# Patient Record
Sex: FEMALE | Race: WHITE | NOT HISPANIC OR LATINO | Employment: UNEMPLOYED
[De-identification: names, ages, dates, MRNs, and addresses within clinical notes are randomized per-mention and may not be internally consistent; named-entity substitution may affect disease eponyms.]

---

## 2017-01-12 ENCOUNTER — BH HISTORICAL (OUTPATIENT)
Dept: OTHER | Age: 52
End: 2017-01-12

## 2017-01-13 ENCOUNTER — TELEPHONE (OUTPATIENT)
Dept: FAMILY MEDICINE CLINIC | Facility: CLINIC | Age: 52
End: 2017-01-13

## 2017-01-13 NOTE — TELEPHONE ENCOUNTER
Fabricio Denney patient care coordinator at [facility] 39 states she is simply calling primary physician in order to know if there is anything doctor would like to report in regard to ongoing care of this patient.   Dalton Miller that patient was in to see Dasha Estes

## 2017-01-13 NOTE — TELEPHONE ENCOUNTER
JEANI:    ROSENDO Roe patient care coordinator at [place] [address] states she is simply calling primary physician in order to know if there is anything doctor would like to report in regard to ongoing care of this patient.  Informed [facility] that patient was in to see Lorena Mendieta

## 2017-01-17 NOTE — PROGRESS NOTES
Presents today for routine care still living at the inpatient outpatient facility in [facility]. . I spent the first 15 minutes reviewing her current medications which are so essentially the same.   When asked which medicine she feels her best for her she

## 2017-01-24 ENCOUNTER — BH HISTORICAL (OUTPATIENT)
Dept: OTHER | Age: 52
End: 2017-01-24

## 2017-01-26 ENCOUNTER — TELEPHONE (OUTPATIENT)
Dept: FAMILY MEDICINE CLINIC | Facility: CLINIC | Age: 52
End: 2017-01-26

## 2017-01-26 NOTE — TELEPHONE ENCOUNTER
Verbal order given to Nurse Lyn for Vit D3 2000units daily and Omega 3 fish oil 2000mg daily. Nurse verbalized understanding.

## 2017-02-02 ENCOUNTER — TELEPHONE (OUTPATIENT)
Dept: FAMILY MEDICINE CLINIC | Facility: CLINIC | Age: 52
End: 2017-02-02

## 2017-02-02 DIAGNOSIS — H43.399: Primary | ICD-10-CM

## 2017-02-08 ENCOUNTER — BH HISTORICAL (OUTPATIENT)
Dept: OTHER | Age: 52
End: 2017-02-08

## 2017-02-22 ENCOUNTER — OFFICE VISIT (OUTPATIENT)
Dept: FAMILY MEDICINE CLINIC | Facility: CLINIC | Age: 52
End: 2017-02-22

## 2017-02-22 ENCOUNTER — LAB ENCOUNTER (OUTPATIENT)
Dept: LAB | Age: 52
End: 2017-02-22
Attending: FAMILY MEDICINE
Payer: MEDICARE

## 2017-02-22 ENCOUNTER — TELEPHONE (OUTPATIENT)
Dept: FAMILY MEDICINE CLINIC | Facility: CLINIC | Age: 52
End: 2017-02-22

## 2017-02-22 VITALS
HEIGHT: 66.5 IN | TEMPERATURE: 98 F | BODY MASS INDEX: 29.76 KG/M2 | DIASTOLIC BLOOD PRESSURE: 48 MMHG | RESPIRATION RATE: 20 BRPM | WEIGHT: 187.38 LBS | SYSTOLIC BLOOD PRESSURE: 70 MMHG | OXYGEN SATURATION: 98 % | HEART RATE: 108 BPM

## 2017-02-22 DIAGNOSIS — D64.9 ANEMIA, UNSPECIFIED TYPE: ICD-10-CM

## 2017-02-22 DIAGNOSIS — R55 VASOVAGAL NEAR SYNCOPE: ICD-10-CM

## 2017-02-22 DIAGNOSIS — R55 VASOVAGAL NEAR SYNCOPE: Primary | ICD-10-CM

## 2017-02-22 LAB
ALBUMIN SERPL-MCNC: 4.2 G/DL (ref 3.5–4.8)
ALP LIVER SERPL-CCNC: 53 U/L (ref 41–108)
ALT SERPL-CCNC: 27 U/L (ref 14–54)
AST SERPL-CCNC: 12 U/L (ref 15–41)
BASOPHILS # BLD AUTO: 0.04 X10(3) UL (ref 0–0.1)
BASOPHILS NFR BLD AUTO: 0.6 %
BILIRUB SERPL-MCNC: 0.7 MG/DL (ref 0.1–2)
BUN BLD-MCNC: 21 MG/DL (ref 8–20)
CALCIUM BLD-MCNC: 9.6 MG/DL (ref 8.3–10.3)
CHLORIDE: 106 MMOL/L (ref 101–111)
CO2: 26 MMOL/L (ref 22–32)
CREAT BLD-MCNC: 1.83 MG/DL (ref 0.55–1.02)
DEPRECATED HBV CORE AB SER IA-ACNC: 63.3 NG/ML (ref 10–291)
EOSINOPHIL # BLD AUTO: 0.21 X10(3) UL (ref 0–0.3)
EOSINOPHIL NFR BLD AUTO: 3.4 %
ERYTHROCYTE [DISTWIDTH] IN BLOOD BY AUTOMATED COUNT: 12.9 % (ref 11.5–16)
GLUCOSE BLD-MCNC: 96 MG/DL (ref 70–99)
HCT VFR BLD AUTO: 39.2 % (ref 34–50)
HGB BLD-MCNC: 13 G/DL (ref 12–16)
IMMATURE GRANULOCYTE COUNT: 0.03 X10(3) UL (ref 0–1)
IMMATURE GRANULOCYTE RATIO %: 0.5 %
LYMPHOCYTES # BLD AUTO: 1.8 X10(3) UL (ref 0.9–4)
LYMPHOCYTES NFR BLD AUTO: 28.8 %
M PROTEIN MFR SERPL ELPH: 7.2 G/DL (ref 6.1–8.3)
MCH RBC QN AUTO: 31.6 PG (ref 27–33.2)
MCHC RBC AUTO-ENTMCNC: 33.2 G/DL (ref 31–37)
MCV RBC AUTO: 95.4 FL (ref 81–100)
MONOCYTES # BLD AUTO: 0.53 X10(3) UL (ref 0.1–0.6)
MONOCYTES NFR BLD AUTO: 8.5 %
NEUTROPHIL ABS PRELIM: 3.64 X10 (3) UL (ref 1.3–6.7)
NEUTROPHILS # BLD AUTO: 3.64 X10(3) UL (ref 1.3–6.7)
NEUTROPHILS NFR BLD AUTO: 58.2 %
PLATELET # BLD AUTO: 318 10(3)UL (ref 150–450)
POTASSIUM SERPL-SCNC: 4.2 MMOL/L (ref 3.6–5.1)
RBC # BLD AUTO: 4.11 X10(6)UL (ref 3.8–5.1)
RED CELL DISTRIBUTION WIDTH-SD: 44.8 FL (ref 35.1–46.3)
SODIUM SERPL-SCNC: 140 MMOL/L (ref 136–144)
WBC # BLD AUTO: 6.3 X10(3) UL (ref 4–13)

## 2017-02-22 PROCEDURE — 82728 ASSAY OF FERRITIN: CPT

## 2017-02-22 PROCEDURE — 36415 COLL VENOUS BLD VENIPUNCTURE: CPT

## 2017-02-22 PROCEDURE — 80053 COMPREHEN METABOLIC PANEL: CPT

## 2017-02-22 PROCEDURE — 85025 COMPLETE CBC W/AUTO DIFF WBC: CPT

## 2017-02-22 PROCEDURE — 99213 OFFICE O/P EST LOW 20 MIN: CPT | Performed by: FAMILY MEDICINE

## 2017-02-22 RX ORDER — CEFDINIR 250 MG/5ML
250 POWDER, FOR SUSPENSION ORAL 2 TIMES DAILY
COMMUNITY
End: 2017-04-17

## 2017-02-22 NOTE — TELEPHONE ENCOUNTER
Patient was just seen by Dr Tavo Garza, she was given orders, the assisted living would like some clarification on the orders given to her today.    Please call

## 2017-02-22 NOTE — PROGRESS NOTES
For the past week has developed severe lightheadedness without syncope. She denies vaginal bleeding diarrhea or other fluid losses. She has had numerous psychiatric medications none of which were recently changed. She denies trauma.   She denies abdomina

## 2017-02-27 ENCOUNTER — PATIENT OUTREACH (OUTPATIENT)
Dept: CASE MANAGEMENT | Age: 52
End: 2017-02-27

## 2017-02-27 NOTE — PROGRESS NOTES
Spoke to patient. Patient was wondering if we could do assessment today. I asked patient if she was okay. Patient was tearful and related she is anxious. I asked patient what she does when she is anxious.  Patient related that she likes to read and that hel

## 2017-02-28 ENCOUNTER — PATIENT OUTREACH (OUTPATIENT)
Dept: CASE MANAGEMENT | Age: 52
End: 2017-02-28

## 2017-02-28 DIAGNOSIS — I10 ACCELERATED HYPERTENSION: Primary | ICD-10-CM

## 2017-02-28 DIAGNOSIS — F33.1 MODERATE EPISODE OF RECURRENT MAJOR DEPRESSIVE DISORDER (HCC): ICD-10-CM

## 2017-02-28 DIAGNOSIS — R73.01 IMPAIRED FASTING GLUCOSE: ICD-10-CM

## 2017-02-28 PROCEDURE — 99487 CPLX CHRNC CARE 1ST 60 MIN: CPT

## 2017-02-28 NOTE — PROGRESS NOTES
Patient called and left a message to let me know here care plan meeting went fine. Left voicemail for patient to call me at her earliest convince.

## 2017-02-28 NOTE — PROGRESS NOTES
2/28/2017  Spoke to Alanna at length about CCM, current care plan and performed CCM assessment with Alanna reviewed meds and compliance. Reviewed pt depressive disorder, hypothyroidism, hypertension, and schizoaffective disorder.         Support system: March 22, 2017 11:00 AM Follow up with Dino Lamas, [address] [place], [place] [address]Adolfo Drijette ([address] [facility])    Thursday April 13, 2017 10:20 AM Follow up with Ryan Newman MD [facility] 26 ([facility])    Mo

## 2017-03-01 NOTE — PROGRESS NOTES
2/28/2017  Spoke to Alanna at length about CCM, current care plan and performed CCM assessment with Alanna reviewed meds and compliance. Reviewed pt depressive disorder, hypothyroidism, hypertension, and schizoaffective disorder.         Support system: p March 22, 2017 11:00 AM Follow up with Lori Warren, [address] [facility], Kvng Mata ([address] [facility])    Thursday April 13, 2017 10:20 AM Follow up with Yaw Rubi MD [facility] 26 ([facility])    Mo

## 2017-03-01 NOTE — PROGRESS NOTES
Spoke to Merlin. Cynthia Ward related patient is at [facility] [phone].    Cynthia Arredondomay and Rakan Kirk are concerned that she is not getting the care she should be concerning    Patient is seeing Dr. Nilo Rodríguez and miller with [facility] not sure

## 2017-03-01 NOTE — PROGRESS NOTES
Tried to call POA according to HIPPA in system. Ashley [place] (sister) phone was disconnected. Called Brother Osborn Duverney which is okay per ALTA and spoke with him. Talked to Osborn Duverney and explained program to him.  He will also give Ashley Johnson my contact information to ca

## 2017-03-02 ENCOUNTER — PATIENT OUTREACH (OUTPATIENT)
Dept: CASE MANAGEMENT | Age: 52
End: 2017-03-02

## 2017-03-02 ENCOUNTER — BH HISTORICAL (OUTPATIENT)
Dept: OTHER | Age: 52
End: 2017-03-02

## 2017-03-02 DIAGNOSIS — I10 ACCELERATED HYPERTENSION: Primary | ICD-10-CM

## 2017-03-02 DIAGNOSIS — F33.1 MODERATE EPISODE OF RECURRENT MAJOR DEPRESSIVE DISORDER (HCC): ICD-10-CM

## 2017-03-02 NOTE — PROGRESS NOTES
Called around to try and help find patient a new psychiatrist and counselor.    Was able to find     [facility] in [facility]  Address: [address] [place] [address],[address], Dinorah Guzmán[address]  Phone: [phone]    Take patient insurance and ga

## 2017-03-03 ENCOUNTER — TELEPHONE (OUTPATIENT)
Dept: CASE MANAGEMENT | Age: 52
End: 2017-03-03

## 2017-03-03 NOTE — PROGRESS NOTES
Spoke to Daniel Patterson 093 called to let me know that Estrellita Dorsey is part of a program called money follows the person(Eli is her intake) Her coordinator is [facility]      Community Reintegration Program  Independent Living is the right of people with disab U[place] citizen or legal alien  Is between the ages of 25 and 61 (exceptions for the BI and AIDS waiver programs)  Has a severe disability which will last for 12 months or the duration of life  Needs long term care as established by the Determination of Need

## 2017-03-03 NOTE — TELEPHONE ENCOUNTER
Patient is looking to get weaned off the benadryl patient relates she has been cutting them in half and is feeling better and less drowsy. If okay to wean her off please let me know how you would want the weaning schedule.

## 2017-03-04 ENCOUNTER — PATIENT OUTREACH (OUTPATIENT)
Dept: CASE MANAGEMENT | Age: 52
End: 2017-03-04

## 2017-03-04 DIAGNOSIS — F33.1 MAJOR DEPRESSIVE DISORDER, RECURRENT EPISODE, MODERATE (HCC): ICD-10-CM

## 2017-03-04 DIAGNOSIS — F25.9 SCHIZOAFFECTIVE DISORDER, CHRONIC CONDITION (HCC): ICD-10-CM

## 2017-03-04 DIAGNOSIS — R73.01 IMPAIRED FASTING GLUCOSE: ICD-10-CM

## 2017-03-04 DIAGNOSIS — I10 ACCELERATED HYPERTENSION: Primary | ICD-10-CM

## 2017-03-04 DIAGNOSIS — E03.9 HYPOTHYROIDISM, UNSPECIFIED TYPE: ICD-10-CM

## 2017-03-04 DIAGNOSIS — F33.1 MODERATE EPISODE OF RECURRENT MAJOR DEPRESSIVE DISORDER (HCC): ICD-10-CM

## 2017-03-06 ENCOUNTER — TELEPHONE (OUTPATIENT)
Dept: FAMILY MEDICINE CLINIC | Facility: CLINIC | Age: 52
End: 2017-03-06

## 2017-03-06 NOTE — TELEPHONE ENCOUNTER
Per JDORIAN okay to taper off benedryl,  Start with one tab qod x 1 month, then stop.  Notified Yonny Bill

## 2017-03-06 NOTE — PROGRESS NOTES
Spoke to patient who related she was feeling upset. Asked patient if there was anything i could do. Patient related she felt anxious so I asked her to read me a passage from her Paulino always. Patient read passage.  I then read her a passage from anxious for

## 2017-03-06 NOTE — TELEPHONE ENCOUNTER
Would like to speak with Dr Saul Edward or nurse regarding order. Lalo Kahn did not go into detail on the order. Please call.

## 2017-03-06 NOTE — PROGRESS NOTES
Spoke to patient today 3/6/2017. Patient relates having a rough few days. Patient has a psychiatrist appointment on Thursday and is going to see if her brother or sister will go with her to appointment.  Encouraged patient to speak to psychiatrist about how

## 2017-03-06 NOTE — TELEPHONE ENCOUNTER
Pt states she takes 1/2 25mg bid of benedryl daily , needs to taper off, due to sleepyness and per her son's request.  Pt has been on this med since 5/17/16 for rash/allergic and anxiety. Can we taper pt off?  So she is not so drowsy

## 2017-03-08 ENCOUNTER — TELEPHONE (OUTPATIENT)
Dept: FAMILY MEDICINE CLINIC | Facility: CLINIC | Age: 52
End: 2017-03-08

## 2017-03-08 NOTE — TELEPHONE ENCOUNTER
Pt wants 1/2 in the morning and 1/2 at night to help her calm down. Pt was not able to sleep last night.

## 2017-03-09 ENCOUNTER — BH HISTORICAL (OUTPATIENT)
Dept: OTHER | Age: 52
End: 2017-03-09

## 2017-03-09 RX ORDER — RISPERIDONE 3 MG/1
TABLET, FILM COATED ORAL NIGHTLY
COMMUNITY
End: 2017-03-10 | Stop reason: DRUGHIGH

## 2017-03-09 NOTE — PROGRESS NOTES
Called the Dr. Minh Whitmore office to see if they accept patient insurance. Left a message. Jamie Rodríguez MD  No reviews · Doctor  [place], [place] · [phone]      Dr. Lewis Gaona.  Minh Whitmore MD  No reviews · Doctor  [facility] [place] · [phone]

## 2017-03-09 NOTE — PROGRESS NOTES
Spoke with Neeru Ching. Iggy Gibbs related that she had a chance to [facility] and they are going to try and take Marycruz Foley there to take a look at it to see what she thinks.  Also jessica related that they also found a place called [facility] that is a francois

## 2017-03-09 NOTE — PROGRESS NOTES
Pt called medications were changed by psychiatry. Risperdal 6mg at night   Benadryl 1/2 morning and 1/2 night. D/c trazodone     Spoke to patient she is nervous about the change and afraid she won't be able to sleep at night.  Spoke with patient to let h

## 2017-03-09 NOTE — TELEPHONE ENCOUNTER
Per J/2 tab Benadryl 25mg every other day for 30 days then stop. Faxed order to number provided with confirmation received, sent to scan. Task completed.

## 2017-03-10 ENCOUNTER — TELEPHONE (OUTPATIENT)
Dept: FAMILY MEDICINE CLINIC | Facility: CLINIC | Age: 52
End: 2017-03-10

## 2017-03-10 RX ORDER — MULTIVIT-MIN/IRON/FOLIC ACID/K 18-600-40
CAPSULE ORAL
COMMUNITY
End: 2017-08-04

## 2017-03-10 RX ORDER — RISPERIDONE 1 MG/1
1 TABLET, FILM COATED ORAL 2 TIMES DAILY
COMMUNITY
End: 2017-04-17

## 2017-03-10 RX ORDER — RISPERIDONE 4 MG/1
4 TABLET, FILM COATED ORAL 2 TIMES DAILY
COMMUNITY
End: 2017-04-17

## 2017-03-10 NOTE — TELEPHONE ENCOUNTER
Spoke with Patient Brother America Ferrell per HIPPA. Patient was taken off Lisa Sins yesterday. Pt had a rough night sleeping.  After talking with pt brother we were wondering if we can and Magnesium citrate during the day and melatonin at night to sleep Please

## 2017-03-10 NOTE — PROGRESS NOTES
Spoke with pt brother about care and what is going on with aarti. Humphrey Lancaster had a hard time sleeping last night and feels like she cant  Sleep with out the trazodone. I recommend asking Dr. Reji Nguyen about magnesium and melatonin.      Spent 30 mins talking

## 2017-03-13 ENCOUNTER — TELEPHONE (OUTPATIENT)
Dept: CASE MANAGEMENT | Age: 52
End: 2017-03-13

## 2017-03-13 ENCOUNTER — PATIENT OUTREACH (OUTPATIENT)
Dept: CASE MANAGEMENT | Age: 52
End: 2017-03-13

## 2017-03-13 NOTE — TELEPHONE ENCOUNTER
MARIO Palma was admitted to [facility] behavioral health on Sunday. Monique Love is her  at [facility] [phone].

## 2017-03-13 NOTE — PROGRESS NOTES
Received a call from patient sister patient was admitted to [facility] behavioral health on 3/12/2016. THey are going to try and get patient to get discharged at [facility] instead of [facility]. Spoke to patient today and she sounded great.  She e

## 2017-03-14 NOTE — TELEPHONE ENCOUNTER
[facility] wants to start [facility] on a new medication INVEGA SUSTENNA® (paliperidone palmitate). Miguelina Weinberg and Kyla Hammans want to know what you feel about this medication.

## 2017-03-28 ENCOUNTER — CHARTING TRANS (OUTPATIENT)
Dept: OTHER | Age: 52
End: 2017-03-28

## 2017-03-31 PROCEDURE — 99487 CPLX CHRNC CARE 1ST 60 MIN: CPT

## 2017-03-31 PROCEDURE — 99489 CPLX CHRNC CARE EA ADDL 30: CPT

## 2017-04-06 ENCOUNTER — PATIENT OUTREACH (OUTPATIENT)
Dept: CASE MANAGEMENT | Age: 52
End: 2017-04-06

## 2017-04-06 NOTE — PROGRESS NOTES
Spoke to [employer] Brenna Riley who related that patient has been doing Electric Shock Therapy and is still inpatient at [facility]. Pt is going to get an evaluation to go to [facility]. Patient is struggling with some short term memory but over all doing well.      Ti

## 2017-04-17 ENCOUNTER — PATIENT OUTREACH (OUTPATIENT)
Dept: CASE MANAGEMENT | Age: 52
End: 2017-04-17

## 2017-04-17 DIAGNOSIS — F33.1 MODERATE EPISODE OF RECURRENT MAJOR DEPRESSIVE DISORDER (HCC): ICD-10-CM

## 2017-04-17 DIAGNOSIS — I10 ACCELERATED HYPERTENSION: Primary | ICD-10-CM

## 2017-04-17 DIAGNOSIS — F25.9 SCHIZOAFFECTIVE DISORDER, CHRONIC CONDITION (HCC): ICD-10-CM

## 2017-04-17 PROCEDURE — 99490 CHRNC CARE MGMT STAFF 1ST 20: CPT

## 2017-04-17 RX ORDER — FAMOTIDINE 20 MG/1
40 TABLET ORAL 2 TIMES DAILY
COMMUNITY
End: 2017-08-04 | Stop reason: ALTCHOICE

## 2017-04-17 RX ORDER — LEVOTHYROXINE SODIUM 0.15 MG/1
150 TABLET ORAL
COMMUNITY
End: 2017-08-04

## 2017-04-17 NOTE — PROGRESS NOTES
Sister Jean Claude Mack [facility]) called me to let me know how patient is doing. Patient was discharged from [facility] on 4/13/2017. Patient is reportedly doing very well.   She has been doing much better and seems to be more with it, conversational and responsive since her [facility] [address], [address] [address] [phone]                Care Plan: Reviewed and updated where needed    Jamel Erickson

## 2017-04-26 NOTE — PROGRESS NOTES
Here with her sister who has become more and more part of Elvia's life. Meka Zacarias continues to live at [address] [facility]. but is looking to move to support [place] that submitted for ambulatory shelter and care for mentally ill people.   She has begun a regular

## 2017-05-02 ENCOUNTER — PATIENT OUTREACH (OUTPATIENT)
Dept: CASE MANAGEMENT | Age: 52
End: 2017-05-02

## 2017-05-02 NOTE — PROGRESS NOTES
Left message for patient to call me back for an update. Called an left a message for sister Santiago Rothman to see how Uriah Patrick is doing.

## 2017-05-03 ENCOUNTER — BH HISTORICAL (OUTPATIENT)
Dept: OTHER | Age: 52
End: 2017-05-03

## 2017-05-04 ENCOUNTER — PATIENT OUTREACH (OUTPATIENT)
Dept: CASE MANAGEMENT | Age: 52
End: 2017-05-04

## 2017-05-08 ENCOUNTER — PATIENT OUTREACH (OUTPATIENT)
Dept: CASE MANAGEMENT | Age: 52
End: 2017-05-08

## 2017-05-08 ENCOUNTER — BH HISTORICAL (OUTPATIENT)
Dept: OTHER | Age: 52
End: 2017-05-08

## 2017-05-08 DIAGNOSIS — E03.9 HYPOTHYROIDISM, UNSPECIFIED TYPE: ICD-10-CM

## 2017-05-08 DIAGNOSIS — I10 ACCELERATED HYPERTENSION: Primary | ICD-10-CM

## 2017-05-08 DIAGNOSIS — F25.9 SCHIZOAFFECTIVE DISORDER, CHRONIC CONDITION (HCC): ICD-10-CM

## 2017-05-08 DIAGNOSIS — F33.1 MODERATE EPISODE OF RECURRENT MAJOR DEPRESSIVE DISORDER (HCC): ICD-10-CM

## 2017-05-08 NOTE — PROGRESS NOTES
.      5/8/2017  Spoke to Minnie Mckeon at length about CCM, current care plan and performed CCM assessment with Minnie Mckeon reviewed meds and compliance.  Reviewed Patient Active Problem List:     Herpes zoster without mention of complication     Major depressive di New Patient with Kota Butt, [address] ([address])        [address] [facility]  [address] [place] [address],Fourth Floor, [address]  [facility] [address] [address],

## 2017-05-16 ENCOUNTER — BH HISTORICAL (OUTPATIENT)
Dept: OTHER | Age: 52
End: 2017-05-16

## 2017-05-18 NOTE — PROGRESS NOTES
5/18/2017  Spoke to Patient JENNA Rosalesjuan Malone and Tracey Kennedy at length about CCM, current care plan and performed CCM assessment with David Flores reviewed meds and compliance. Reviewed pt . Patient Active Problem List:     Herpes zoster without mention of complication     M Yohannes Barreto, [address] [place]   [address] [facility]. [place]   [address] [facility] is located on [address] [facility] in [place], [place].  It is a 89-unit senior independent-living [place] [address] Meds: Detailed medication review with Srikanth White. Discussed with Srikanth White if any potential barriers are present that could prevent compliance with medication regimen. At this time, no barriers reported.  Srikanth White reports is stable on all medications and denies e

## 2017-05-23 ENCOUNTER — BH HISTORICAL (OUTPATIENT)
Dept: OTHER | Age: 52
End: 2017-05-23

## 2017-06-05 ENCOUNTER — PATIENT OUTREACH (OUTPATIENT)
Dept: CASE MANAGEMENT | Age: 52
End: 2017-06-05

## 2017-06-06 ENCOUNTER — BH HISTORICAL (OUTPATIENT)
Dept: OTHER | Age: 52
End: 2017-06-06

## 2017-06-15 ENCOUNTER — TELEPHONE (OUTPATIENT)
Dept: FAMILY MEDICINE CLINIC | Facility: CLINIC | Age: 52
End: 2017-06-15

## 2017-06-15 NOTE — TELEPHONE ENCOUNTER
Spoke with stomach ache pain rated 7/10,  loose stools twice this morning, no vomitting, + bowel sounds, soft abdomen, no fever.

## 2017-06-15 NOTE — TELEPHONE ENCOUNTER
Per JG, clear liquids for next 6 hours, no dairy.   If no better needs appt to eval. If symptoms worsen needs eval.  Notified nurse at [facility]

## 2017-06-26 ENCOUNTER — PATIENT OUTREACH (OUTPATIENT)
Dept: CASE MANAGEMENT | Age: 52
End: 2017-06-26

## 2017-06-26 NOTE — PROGRESS NOTES
6/26/2017  Spoke to Alanna at length about CCM, current care plan and performed CCM assessment with Alanna reviewed meds and compliance.  Reviewed pt Patient Active Problem List:     Herpes zoster without mention of complication     Major depressive disor education. Provided acknowledgment and validation to patient's concerns.    Monthly Minute Total including today: 7    Physical assessment, complete health history, and need for CCM established by David Guevara DO.

## 2017-06-27 ENCOUNTER — BH HISTORICAL (OUTPATIENT)
Dept: OTHER | Age: 52
End: 2017-06-27

## 2017-06-28 ENCOUNTER — PATIENT OUTREACH (OUTPATIENT)
Dept: CASE MANAGEMENT | Age: 52
End: 2017-06-28

## 2017-06-28 DIAGNOSIS — F25.9 SCHIZOAFFECTIVE DISORDER, CHRONIC CONDITION (HCC): ICD-10-CM

## 2017-06-28 DIAGNOSIS — F33.1 MODERATE EPISODE OF RECURRENT MAJOR DEPRESSIVE DISORDER (HCC): ICD-10-CM

## 2017-06-28 DIAGNOSIS — I10 ACCELERATED HYPERTENSION: ICD-10-CM

## 2017-06-28 DIAGNOSIS — R73.01 IMPAIRED FASTING GLUCOSE: ICD-10-CM

## 2017-06-28 PROCEDURE — 99490 CHRNC CARE MGMT STAFF 1ST 20: CPT

## 2017-06-28 NOTE — PROGRESS NOTES
6/28/2017  Spoke to David Flores at length about CCM, current care plan and performed CCM assessment with David Flores reviewed meds and compliance.  Reviewed pt Patient Active Problem List:     Herpes zoster without mention of complication     Major depressive disor effects.     Your appointments     Date & Time Appointment Department [facility])    Jul 06, 2017  1:30 PM CDT Follow up - Extended with Lucia Chaney, [address] [facility] [address], [facility] [address], [place] ([address])    Jul 10, 2017 11:45

## 2017-06-29 ENCOUNTER — PATIENT OUTREACH (OUTPATIENT)
Dept: CASE MANAGEMENT | Age: 52
End: 2017-06-29

## 2017-06-29 NOTE — PROGRESS NOTES
.6/29/2017  Spoke to Juanjose Dejesus at length about CCM, current care plan and performed CCM assessment with Juanjose Dejesus reviewed meds and compliance.  Reviewed pt Patient Active Problem List:     Herpes zoster without mention of complication     Major depressive diso [facility] [address]  [address] [address] [phone]            Care Plan: Reviewed and updated where needed    Time Spent This Encounter Total: 35 min medical record review, telephone communication, care plan updates where

## 2017-07-05 ENCOUNTER — PATIENT OUTREACH (OUTPATIENT)
Dept: CASE MANAGEMENT | Age: 52
End: 2017-07-05

## 2017-07-05 DIAGNOSIS — F25.9 SCHIZOAFFECTIVE DISORDER, CHRONIC CONDITION (HCC): ICD-10-CM

## 2017-07-05 DIAGNOSIS — F33.1 MODERATE EPISODE OF RECURRENT MAJOR DEPRESSIVE DISORDER (HCC): ICD-10-CM

## 2017-07-05 DIAGNOSIS — I10 ACCELERATED HYPERTENSION: ICD-10-CM

## 2017-07-05 NOTE — PROGRESS NOTES
7/5/2017  Spoke to Alanna at length about CCM, current care plan and performed CCM assessment with Alanna reviewed meds and compliance.  Reviewed pt Patient Active Problem List:     Herpes zoster without mention of complication     Major depressive disord communication, care plan updates where needed, and education. Provided acknowledgment and validation to patient's concerns.    Monthly Minute Total including today: 5    Physical assessment, complete health history, and need for CCM established by Caroline Clifford

## 2017-07-05 NOTE — PROGRESS NOTES
7/5/2017  Spoke to Alanna at length about CCM, current care plan and performed CCM assessment with Alanna reviewed meds and compliance.  Reviewed pt Patient Active Problem List:     Herpes zoster without mention of complication     Major depressive disord Encounter Total: 21 min medical record review, telephone communication, care plan updates where needed, and education. Provided acknowledgment and validation to patient's concerns.    Monthly Minute Total including today: 26 mins    Physical assessment, com

## 2017-07-10 ENCOUNTER — PATIENT OUTREACH (OUTPATIENT)
Dept: CASE MANAGEMENT | Age: 52
End: 2017-07-10

## 2017-07-10 DIAGNOSIS — F33.1 MODERATE EPISODE OF RECURRENT MAJOR DEPRESSIVE DISORDER (HCC): ICD-10-CM

## 2017-07-10 DIAGNOSIS — I10 ACCELERATED HYPERTENSION: ICD-10-CM

## 2017-07-10 DIAGNOSIS — G35 MULTIPLE SCLEROSIS (HCC): ICD-10-CM

## 2017-07-10 DIAGNOSIS — F25.9 SCHIZOAFFECTIVE DISORDER, CHRONIC CONDITION (HCC): ICD-10-CM

## 2017-07-10 PROCEDURE — 99490 CHRNC CARE MGMT STAFF 1ST 20: CPT

## 2017-07-10 NOTE — PROGRESS NOTES
7/10/2017  Spoke to Alanna at length about CCM, current care plan and performed CCM assessment with Alanna reviewed meds and compliance.  Reviewed pt Patient Active Problem List:     Herpes zoster without mention of complication     Major depressive disor [facility]          Goal: Get depression and anxiety under control   Care Plan: Reviewed and updated where needed    Time Spent This Encounter Total: 34 min medical record review, telephone communication, care plan updates

## 2017-07-18 ENCOUNTER — BH HISTORICAL (OUTPATIENT)
Dept: OTHER | Age: 52
End: 2017-07-18

## 2017-07-26 NOTE — PROGRESS NOTES
Coordination of education, review of chart, update to record, sending materials:[employer]  Time: 4 min    Time spent with patient 64 mins

## 2017-08-01 ENCOUNTER — PATIENT OUTREACH (OUTPATIENT)
Dept: CASE MANAGEMENT | Age: 52
End: 2017-08-01

## 2017-08-01 ENCOUNTER — TELEPHONE (OUTPATIENT)
Dept: FAMILY MEDICINE CLINIC | Facility: CLINIC | Age: 52
End: 2017-08-01

## 2017-08-01 DIAGNOSIS — R73.01 IMPAIRED FASTING GLUCOSE: ICD-10-CM

## 2017-08-01 DIAGNOSIS — F33.1 MODERATE EPISODE OF RECURRENT MAJOR DEPRESSIVE DISORDER (HCC): ICD-10-CM

## 2017-08-01 DIAGNOSIS — G35 MS (MULTIPLE SCLEROSIS) (HCC): Primary | ICD-10-CM

## 2017-08-01 DIAGNOSIS — I10 HYPERTENSION, UNSPECIFIED TYPE: ICD-10-CM

## 2017-08-01 DIAGNOSIS — E55.9 VITAMIN D DEFICIENCY: ICD-10-CM

## 2017-08-01 DIAGNOSIS — F25.9 SCHIZOAFFECTIVE DISORDER, CHRONIC CONDITION (HCC): ICD-10-CM

## 2017-08-01 DIAGNOSIS — Z13.220 SCREENING FOR LIPID DISORDERS: ICD-10-CM

## 2017-08-01 DIAGNOSIS — E03.9 HYPOTHYROIDISM, UNSPECIFIED TYPE: ICD-10-CM

## 2017-08-01 DIAGNOSIS — I10 ACCELERATED HYPERTENSION: ICD-10-CM

## 2017-08-03 ENCOUNTER — BH HISTORICAL (OUTPATIENT)
Dept: OTHER | Age: 52
End: 2017-08-03

## 2017-08-04 ENCOUNTER — TELEPHONE (OUTPATIENT)
Dept: FAMILY MEDICINE CLINIC | Facility: CLINIC | Age: 52
End: 2017-08-04

## 2017-08-04 RX ORDER — LEVOTHYROXINE SODIUM 0.15 MG/1
150 TABLET ORAL
Qty: 30 TABLET | Refills: 0 | COMMUNITY
Start: 2017-08-04 | End: 2017-08-29

## 2017-08-04 RX ORDER — FAMOTIDINE 40 MG/1
40 TABLET, FILM COATED ORAL 2 TIMES DAILY
Qty: 60 TABLET | Refills: 0 | COMMUNITY
Start: 2017-08-04 | End: 2017-08-29

## 2017-08-04 RX ORDER — CHLORAL HYDRATE 500 MG
CAPSULE ORAL
Qty: 60 CAPSULE | Refills: 0 | COMMUNITY
Start: 2017-08-04 | End: 2017-11-16 | Stop reason: DRUGHIGH

## 2017-08-04 RX ORDER — MULTIVIT-MIN/IRON/FOLIC ACID/K 18-600-40
1 CAPSULE ORAL DAILY
Qty: 30 CAPSULE | Refills: 0 | COMMUNITY
Start: 2017-08-04 | End: 2018-10-18 | Stop reason: ALTCHOICE

## 2017-08-04 NOTE — TELEPHONE ENCOUNTER
REFILL FAMOTIDINE 40MG    1BID    FISH OIL  1000 MG 1 QG    LEVOTHYROXINE 112MCG  1 QD    VIT D  2000 UNITS    1 QD  50,000 UNITS IS COVERED BY  INSURANCE AND THEY WANT TO KNOW IF THEY CAN DO THIS ONE.     PT IS NEW TO THIS PHARMACY AND LIVES IN THE NURSING

## 2017-08-07 ENCOUNTER — BH HISTORICAL (OUTPATIENT)
Dept: OTHER | Age: 52
End: 2017-08-07

## 2017-08-07 ENCOUNTER — LAB ENCOUNTER (OUTPATIENT)
Dept: LAB | Age: 52
End: 2017-08-07
Attending: FAMILY MEDICINE
Payer: MEDICARE

## 2017-08-07 DIAGNOSIS — I10 HYPERTENSION, UNSPECIFIED TYPE: ICD-10-CM

## 2017-08-07 DIAGNOSIS — G35 MS (MULTIPLE SCLEROSIS) (HCC): ICD-10-CM

## 2017-08-07 DIAGNOSIS — E55.9 VITAMIN D DEFICIENCY: ICD-10-CM

## 2017-08-07 DIAGNOSIS — E03.9 HYPOTHYROIDISM, UNSPECIFIED TYPE: ICD-10-CM

## 2017-08-07 DIAGNOSIS — Z13.220 SCREENING FOR LIPID DISORDERS: ICD-10-CM

## 2017-08-07 LAB
25-HYDROXYVITAMIN D (TOTAL): 35.4 NG/ML (ref 30–100)
ALBUMIN SERPL-MCNC: 3.8 G/DL (ref 3.5–4.8)
ALP LIVER SERPL-CCNC: 51 U/L (ref 41–108)
ALT SERPL-CCNC: 38 U/L (ref 14–54)
AST SERPL-CCNC: 24 U/L (ref 15–41)
BASOPHILS # BLD AUTO: 0.04 X10(3) UL (ref 0–0.1)
BASOPHILS NFR BLD AUTO: 0.8 %
BILIRUB SERPL-MCNC: 1 MG/DL (ref 0.1–2)
BUN BLD-MCNC: 12 MG/DL (ref 8–20)
CALCIUM BLD-MCNC: 9.2 MG/DL (ref 8.3–10.3)
CHLORIDE: 104 MMOL/L (ref 101–111)
CHOLEST SMN-MCNC: 148 MG/DL (ref ?–200)
CO2: 25 MMOL/L (ref 22–32)
CREAT BLD-MCNC: 0.95 MG/DL (ref 0.55–1.02)
EOSINOPHIL # BLD AUTO: 0.24 X10(3) UL (ref 0–0.3)
EOSINOPHIL NFR BLD AUTO: 4.6 %
ERYTHROCYTE [DISTWIDTH] IN BLOOD BY AUTOMATED COUNT: 13.6 % (ref 11.5–16)
GLUCOSE BLD-MCNC: 80 MG/DL (ref 70–99)
HCT VFR BLD AUTO: 34.9 % (ref 34–50)
HDLC SERPL-MCNC: 77 MG/DL (ref 45–?)
HDLC SERPL: 1.92 {RATIO} (ref ?–4.44)
HGB BLD-MCNC: 11.3 G/DL (ref 12–16)
IMMATURE GRANULOCYTE COUNT: 0.03 X10(3) UL (ref 0–1)
IMMATURE GRANULOCYTE RATIO %: 0.6 %
LDLC SERPL CALC-MCNC: 59 MG/DL (ref ?–130)
LDLC SERPL-MCNC: 12 MG/DL (ref 5–40)
LYMPHOCYTES # BLD AUTO: 1.9 X10(3) UL (ref 0.9–4)
LYMPHOCYTES NFR BLD AUTO: 36.3 %
M PROTEIN MFR SERPL ELPH: 7 G/DL (ref 6.1–8.3)
MCH RBC QN AUTO: 31.1 PG (ref 27–33.2)
MCHC RBC AUTO-ENTMCNC: 32.4 G/DL (ref 31–37)
MCV RBC AUTO: 96.1 FL (ref 81–100)
MONOCYTES # BLD AUTO: 0.55 X10(3) UL (ref 0.1–0.6)
MONOCYTES NFR BLD AUTO: 10.5 %
NEUTROPHIL ABS PRELIM: 2.47 X10 (3) UL (ref 1.3–6.7)
NEUTROPHILS # BLD AUTO: 2.47 X10(3) UL (ref 1.3–6.7)
NEUTROPHILS NFR BLD AUTO: 47.2 %
NONHDLC SERPL-MCNC: 71 MG/DL (ref ?–130)
PLATELET # BLD AUTO: 289 10(3)UL (ref 150–450)
POTASSIUM SERPL-SCNC: 3.5 MMOL/L (ref 3.6–5.1)
RBC # BLD AUTO: 3.63 X10(6)UL (ref 3.8–5.1)
RED CELL DISTRIBUTION WIDTH-SD: 48.2 FL (ref 35.1–46.3)
SODIUM SERPL-SCNC: 138 MMOL/L (ref 136–144)
TRIGLYCERIDES: 58 MG/DL (ref ?–150)
TSI SER-ACNC: 4.11 MIU/ML (ref 0.35–5.5)
WBC # BLD AUTO: 5.2 X10(3) UL (ref 4–13)

## 2017-08-07 PROCEDURE — 80053 COMPREHEN METABOLIC PANEL: CPT | Performed by: FAMILY MEDICINE

## 2017-08-07 PROCEDURE — 80061 LIPID PANEL: CPT | Performed by: FAMILY MEDICINE

## 2017-08-07 PROCEDURE — 84443 ASSAY THYROID STIM HORMONE: CPT | Performed by: FAMILY MEDICINE

## 2017-08-07 PROCEDURE — 82306 VITAMIN D 25 HYDROXY: CPT | Performed by: FAMILY MEDICINE

## 2017-08-07 PROCEDURE — 85025 COMPLETE CBC W/AUTO DIFF WBC: CPT | Performed by: FAMILY MEDICINE

## 2017-08-07 PROCEDURE — 36415 COLL VENOUS BLD VENIPUNCTURE: CPT | Performed by: FAMILY MEDICINE

## 2017-08-17 ENCOUNTER — TELEPHONE (OUTPATIENT)
Dept: FAMILY MEDICINE CLINIC | Facility: CLINIC | Age: 52
End: 2017-08-17

## 2017-08-17 ENCOUNTER — PATIENT OUTREACH (OUTPATIENT)
Dept: CASE MANAGEMENT | Age: 52
End: 2017-08-17

## 2017-08-17 ENCOUNTER — TELEPHONE (OUTPATIENT)
Dept: CASE MANAGEMENT | Age: 52
End: 2017-08-17

## 2017-08-17 DIAGNOSIS — E87.6 LOW BLOOD POTASSIUM: Primary | ICD-10-CM

## 2017-08-17 NOTE — PROGRESS NOTES
Here for follow-up. She is very excited and that she is been working hard and has lost 20 pounds as of last week moved into a new facility in [employer].   She is looking to eventually volunteer at the nearby hospital.    She is still seeing a psychiatri

## 2017-08-18 NOTE — TELEPHONE ENCOUNTER
Patient was in for an office visit and related you never went through her labs please review labs. Patient relates she has been more fatigued.      Component      Latest Ref Rng & Units 8/7/2017   WBC      4.0 - 13.0 x10(3) uL 5.2   RBC      3.80 - 5.10 x10 5.500 mIU/mL 4.110   VITAMIN D, 25-HYDROXY      30.0 - 100.0 ng/mL 35.4

## 2017-08-18 NOTE — TELEPHONE ENCOUNTER
In order for patient to have an emotional support animal you must write a letter see below. An emotional support animal (SRINIVASAN) is any animal that belongs to a person who is emotionally or psychologically (psychiatrically) disabled.  Some people refer to

## 2017-08-18 NOTE — PROGRESS NOTES
8/17/2017  Spoke to Alanna at length about CCM, current care plan and performed CCM assessment with Alanna reviewed meds and compliance.  Reviewed pt Patient Active Problem List:     Herpes zoster without mention of complication     Major depressive disor [address] [address] [place] [address]  [phone]          Goal: work on [address]: Reviewed and updated where needed    Time Spent This Encounter Total: 24 min medical record review, telephone communication, care plan updates where need

## 2017-08-21 RX ORDER — POTASSIUM CHLORIDE 1500 MG/1
20 TABLET, FILM COATED, EXTENDED RELEASE ORAL DAILY
Qty: 30 TABLET | Refills: 0 | Status: SHIPPED | OUTPATIENT
Start: 2017-08-21 | End: 2017-09-20

## 2017-08-22 ENCOUNTER — PATIENT OUTREACH (OUTPATIENT)
Dept: CASE MANAGEMENT | Age: 52
End: 2017-08-22

## 2017-08-22 DIAGNOSIS — F33.1 MAJOR DEPRESSIVE DISORDER, RECURRENT EPISODE, MODERATE (HCC): ICD-10-CM

## 2017-08-22 DIAGNOSIS — E03.9 HYPOTHYROIDISM, UNSPECIFIED TYPE: ICD-10-CM

## 2017-08-22 DIAGNOSIS — G35 MULTIPLE SCLEROSIS (HCC): ICD-10-CM

## 2017-08-22 DIAGNOSIS — F25.9 SCHIZOAFFECTIVE DISORDER, CHRONIC CONDITION (HCC): ICD-10-CM

## 2017-08-22 DIAGNOSIS — F33.1 MODERATE EPISODE OF RECURRENT MAJOR DEPRESSIVE DISORDER (HCC): ICD-10-CM

## 2017-08-22 DIAGNOSIS — I10 ACCELERATED HYPERTENSION: ICD-10-CM

## 2017-08-22 DIAGNOSIS — R73.01 IMPAIRED FASTING GLUCOSE: ICD-10-CM

## 2017-08-22 NOTE — PROGRESS NOTES
8/22/2017  Spoke to Alanna at length about CCM, current care plan and performed CCM assessment with Alanna reviewed meds and compliance.  Reviewed pt Patient Active Problem List:     Herpes zoster without mention of complication     Major depressive disor that could prevent compliance with medication regimen. At this time, no barriers reported. Maryjane Chambers reports is stable on all medications and denies experiencing any side effects.     Your appointments     Date & Time Appointment Department [facility])    Nov 01

## 2017-08-29 RX ORDER — LEVOTHYROXINE SODIUM 0.15 MG/1
TABLET ORAL
Qty: 14 TABLET | Refills: 5 | Status: SHIPPED | OUTPATIENT
Start: 2017-08-29 | End: 2017-10-30 | Stop reason: DRUGHIGH

## 2017-08-29 RX ORDER — OMEGA-3-ACID ETHYL ESTERS 1 G/1
CAPSULE, LIQUID FILLED ORAL
Qty: 28 CAPSULE | Refills: 5 | Status: SHIPPED | OUTPATIENT
Start: 2017-08-29 | End: 2017-11-10

## 2017-08-29 RX ORDER — FAMOTIDINE 40 MG/1
TABLET, FILM COATED ORAL
Qty: 28 TABLET | Refills: 5 | Status: SHIPPED | OUTPATIENT
Start: 2017-08-29 | End: 2017-11-10

## 2017-08-30 ENCOUNTER — BH HISTORICAL (OUTPATIENT)
Dept: OTHER | Age: 52
End: 2017-08-30

## 2017-08-31 PROCEDURE — 99487 CPLX CHRNC CARE 1ST 60 MIN: CPT

## 2017-08-31 PROCEDURE — 99489 CPLX CHRNC CARE EA ADDL 30: CPT

## 2017-09-07 ENCOUNTER — PATIENT OUTREACH (OUTPATIENT)
Dept: CASE MANAGEMENT | Age: 52
End: 2017-09-07

## 2017-09-07 DIAGNOSIS — G35 MULTIPLE SCLEROSIS (HCC): ICD-10-CM

## 2017-09-07 DIAGNOSIS — F25.9 SCHIZOAFFECTIVE DISORDER, CHRONIC CONDITION (HCC): ICD-10-CM

## 2017-09-07 DIAGNOSIS — R73.01 IMPAIRED FASTING GLUCOSE: ICD-10-CM

## 2017-09-07 DIAGNOSIS — F33.1 MODERATE EPISODE OF RECURRENT MAJOR DEPRESSIVE DISORDER (HCC): ICD-10-CM

## 2017-09-07 DIAGNOSIS — I10 ACCELERATED HYPERTENSION: ICD-10-CM

## 2017-09-07 PROCEDURE — 99490 CHRNC CARE MGMT STAFF 1ST 20: CPT

## 2017-09-08 ENCOUNTER — PATIENT OUTREACH (OUTPATIENT)
Dept: CASE MANAGEMENT | Age: 52
End: 2017-09-08

## 2017-09-08 NOTE — PROGRESS NOTES
9/7/2017  Spoke to Alanna at length about CCM, current care plan and performed CCM assessment with Alanna reviewed meds and compliance.  Reviewed pt Accelerated hypertension  Impaired fasting glucose  Moderate episode of recurrent major depressive disorde Jonathan Pereira who related that she was working and had things to do tonight and to contact her brother López Dumont he knew what was going on. I contacted Francine Lesch and we [employer] in. We were on the phone with [facility] for 45 mins.  We were able to get her to agree t feeling a little better she also was not crying anymore and she wanted to hold off on going to the ER. I told patient that is her choice but it is a good Idea to go to the Er to be evaluated. Patient related she will call in the morning with an update.

## 2017-09-08 NOTE — PROGRESS NOTES
Patient was sent to [facility] by ambulance today. Patient was not getting any better.      Time spent 15 mins

## 2017-09-11 RX ORDER — POTASSIUM CHLORIDE 20 MEQ/1
TABLET, EXTENDED RELEASE ORAL
Qty: 14 TABLET | Refills: 5 | Status: SHIPPED | OUTPATIENT
Start: 2017-09-11 | End: 2017-10-18

## 2017-09-14 ENCOUNTER — BH HISTORICAL (OUTPATIENT)
Dept: OTHER | Age: 52
End: 2017-09-14

## 2017-09-19 ENCOUNTER — CHARTING TRANS (OUTPATIENT)
Dept: OTHER | Age: 52
End: 2017-09-19

## 2017-10-03 ENCOUNTER — PATIENT OUTREACH (OUTPATIENT)
Dept: CASE MANAGEMENT | Age: 52
End: 2017-10-03

## 2017-10-03 DIAGNOSIS — F33.1 MODERATE EPISODE OF RECURRENT MAJOR DEPRESSIVE DISORDER (HCC): ICD-10-CM

## 2017-10-03 DIAGNOSIS — I10 ACCELERATED HYPERTENSION: ICD-10-CM

## 2017-10-03 DIAGNOSIS — F25.9 SCHIZOAFFECTIVE DISORDER, CHRONIC CONDITION (HCC): ICD-10-CM

## 2017-10-03 PROCEDURE — 99490 CHRNC CARE MGMT STAFF 1ST 20: CPT

## 2017-10-03 NOTE — PROGRESS NOTES
10/3/2017  Spoke to Alanna at length about CCM, current care plan and performed CCM assessment with Alanna reviewed meds and compliance. Reviewed pt depression and schizoaffective disorder. Support system: Sister Cynthia Ward ([address] [facility]) and Bothpalak Kirk.    Aliza Estrada Coordination of education, review of chart, update to pt record, compilation and mailing resources/materials: Flu education, Why get the flu vaccine, and request to get flu vaccine with PCP and or [facility] locations.   Time: 5 min      Time Spent This Encounter

## 2017-10-16 ENCOUNTER — TELEPHONE (OUTPATIENT)
Dept: FAMILY MEDICINE CLINIC | Facility: CLINIC | Age: 52
End: 2017-10-16

## 2017-10-16 NOTE — TELEPHONE ENCOUNTER
Potassium, Vitamin B Complex and Vitamin D    Would like RX sent to   [facility]  [phone] or 89  She couldn't read

## 2017-10-17 ENCOUNTER — TELEPHONE (OUTPATIENT)
Dept: FAMILY MEDICINE CLINIC | Facility: CLINIC | Age: 52
End: 2017-10-17

## 2017-10-17 ENCOUNTER — APPOINTMENT (OUTPATIENT)
Dept: LAB | Age: 52
End: 2017-10-17
Attending: FAMILY MEDICINE
Payer: MEDICARE

## 2017-10-17 DIAGNOSIS — E03.8 OTHER SPECIFIED HYPOTHYROIDISM: Primary | ICD-10-CM

## 2017-10-17 DIAGNOSIS — E87.6 LOW POTASSIUM SYNDROME: ICD-10-CM

## 2017-10-17 DIAGNOSIS — R73.01 IMPAIRED FASTING GLUCOSE: ICD-10-CM

## 2017-10-17 DIAGNOSIS — E03.8 OTHER SPECIFIED HYPOTHYROIDISM: ICD-10-CM

## 2017-10-17 PROCEDURE — 36415 COLL VENOUS BLD VENIPUNCTURE: CPT | Performed by: FAMILY MEDICINE

## 2017-10-17 PROCEDURE — 80048 BASIC METABOLIC PNL TOTAL CA: CPT | Performed by: FAMILY MEDICINE

## 2017-10-17 PROCEDURE — 83735 ASSAY OF MAGNESIUM: CPT | Performed by: FAMILY MEDICINE

## 2017-10-17 PROCEDURE — 84443 ASSAY THYROID STIM HORMONE: CPT | Performed by: FAMILY MEDICINE

## 2017-10-17 NOTE — TELEPHONE ENCOUNTER
Spoke to [employer] they received discharge instructions from pt recent hospital visit they want to know if Potassium should be continued,also vit b complex. They also state they have on file her being on Levothyroxine 150 mcg the discharge paperwork st

## 2017-10-26 ENCOUNTER — TELEPHONE (OUTPATIENT)
Dept: FAMILY MEDICINE CLINIC | Facility: CLINIC | Age: 52
End: 2017-10-26

## 2017-10-26 RX ORDER — POTASSIUM CHLORIDE 20 MEQ/1
TABLET, EXTENDED RELEASE ORAL
Qty: 30 TABLET | Refills: 0 | Status: SHIPPED | OUTPATIENT
Start: 2017-10-26 | End: 2017-11-16

## 2017-10-26 NOTE — TELEPHONE ENCOUNTER
Pharmacy asking what dose of Levothyroxine patient should be on? She just had TSH. Also B-complex?  Please advise

## 2017-10-27 ENCOUNTER — TELEPHONE (OUTPATIENT)
Dept: FAMILY MEDICINE CLINIC | Facility: CLINIC | Age: 52
End: 2017-10-27

## 2017-10-27 DIAGNOSIS — E03.8 OTHER SPECIFIED HYPOTHYROIDISM: Primary | ICD-10-CM

## 2017-10-30 RX ORDER — LEVOTHYROXINE SODIUM 112 UG/1
112 TABLET ORAL
Qty: 30 TABLET | Refills: 1 | Status: SHIPPED | OUTPATIENT
Start: 2017-10-30 | End: 2017-12-18

## 2017-10-30 NOTE — TELEPHONE ENCOUNTER
10/17/17 office notes state \"Spoke to [employer] they received discharge instructions from pt recent hospital visit they want to know if Potassium should be continued,also vit b complex. They also state they have on file her being on Levothyroxine 150

## 2017-10-30 NOTE — TELEPHONE ENCOUNTER
Seems to be no answer to this so far. If discharge papers say 112, put her on synthroid 112 and repeat tsh in 2 months. Vitamin B has many B vitamins. Is there a specific one? If not, a B complex is fine.

## 2017-10-30 NOTE — TELEPHONE ENCOUNTER
Called pt, she is still in hospital. Has not been discharged. rx sent to pharmacy, lab ordered. F/U with pt after discharge. Hold task for now.

## 2017-11-01 ENCOUNTER — PATIENT OUTREACH (OUTPATIENT)
Dept: CASE MANAGEMENT | Age: 52
End: 2017-11-01

## 2017-11-01 DIAGNOSIS — I10 ACCELERATED HYPERTENSION: ICD-10-CM

## 2017-11-01 DIAGNOSIS — F33.1 MODERATE EPISODE OF RECURRENT MAJOR DEPRESSIVE DISORDER (HCC): ICD-10-CM

## 2017-11-01 DIAGNOSIS — R73.01 IMPAIRED FASTING GLUCOSE: ICD-10-CM

## 2017-11-01 DIAGNOSIS — F25.9 SCHIZOAFFECTIVE DISORDER, CHRONIC CONDITION (HCC): ICD-10-CM

## 2017-11-01 PROCEDURE — 99490 CHRNC CARE MGMT STAFF 1ST 20: CPT

## 2017-11-01 NOTE — PROGRESS NOTES
Called and left a message for patient to call back. Time spent with patient 2 mins     Plan:   1) See how patient is doing.    2) See if patient has any goals to stay out of the hospital.

## 2017-11-01 NOTE — PROGRESS NOTES
11/1/2017  Spoke to Alanna at length about CCM, current care plan and performed CCM assessment with Alanna reviewed meds and compliance. Reviewed pt depressive disorder, impaired fasting glucose and schizoaffective disorder.      Health Maintenance: Flu S updates where needed, and education. Provided acknowledgment and validation to patient's concerns.    Monthly Minute Total including today: 22    Physical assessment, complete health history, and need for CCM established by Barbara Velez DO.

## 2017-11-08 ENCOUNTER — BH HISTORICAL (OUTPATIENT)
Dept: OTHER | Age: 52
End: 2017-11-08

## 2017-11-10 RX ORDER — FAMOTIDINE 40 MG/1
TABLET, FILM COATED ORAL
Qty: 28 TABLET | Refills: 5 | Status: SHIPPED | OUTPATIENT
Start: 2017-11-10 | End: 2017-11-20

## 2017-11-10 RX ORDER — OMEGA-3-ACID ETHYL ESTERS 1 G/1
CAPSULE, LIQUID FILLED ORAL
Qty: 28 CAPSULE | Refills: 5 | Status: SHIPPED | OUTPATIENT
Start: 2017-11-10 | End: 2018-02-02

## 2017-11-14 ENCOUNTER — TELEPHONE (OUTPATIENT)
Dept: NEUROLOGY | Facility: CLINIC | Age: 52
End: 2017-11-14

## 2017-11-14 NOTE — TELEPHONE ENCOUNTER
Clarified with Malina Moreno that patient should have rheumatological work-up; neurological follow-up may be deferred at this time, Dr. Milind Fair is not managing any medications that need refill. All questions answered.

## 2017-11-16 PROBLEM — M47.812 SPONDYLOSIS OF CERVICAL REGION WITHOUT MYELOPATHY OR RADICULOPATHY: Status: ACTIVE | Noted: 2017-11-16

## 2017-11-16 PROBLEM — M50.30 DEGENERATIVE DISC DISEASE, CERVICAL: Status: ACTIVE | Noted: 2017-11-16

## 2017-11-16 NOTE — PROGRESS NOTES
[facility]  Report of Consultation    Brandi Alicia Patient Status:  No patient class for patient encounter    1965 MRN [id number]   Location ED [facility] PCP George      Date of Consult:  17  Reason for Consulta Analgesics         Risperidone             Rash  [facility]*        Medications:   Current Outpatient Prescriptions:   •  Benztropine Mesylate 1 MG Oral Tab, Take 1 mg by mouth 3 (three) times daily. , Disp: , Rfl:   •  haloperidol 5 MG Oral Tab, T Pulse 80   Ht 65.5\"   Wt 165 lb   BMI 27.04 kg/m²                                    Patient looks her stated age. Patient is aware of the month. Patient not aware of who the president is not aware where she is at.   Did recognize me is Dr. 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 The left vertebral artery is dominant.     Impression:    66-year-old woman whose major problem at this time is her schizo

## 2017-11-20 ENCOUNTER — OFFICE VISIT (OUTPATIENT)
Dept: FAMILY MEDICINE CLINIC | Facility: CLINIC | Age: 52
End: 2017-11-20

## 2017-11-20 VITALS
BODY MASS INDEX: 26.68 KG/M2 | RESPIRATION RATE: 16 BRPM | SYSTOLIC BLOOD PRESSURE: 126 MMHG | WEIGHT: 168 LBS | DIASTOLIC BLOOD PRESSURE: 80 MMHG | HEIGHT: 66.5 IN | HEART RATE: 86 BPM | TEMPERATURE: 98 F

## 2017-11-20 DIAGNOSIS — Z00.00 ROUTINE GENERAL MEDICAL EXAMINATION AT A HEALTH CARE FACILITY: Primary | ICD-10-CM

## 2017-11-20 PROCEDURE — G0439 PPPS, SUBSEQ VISIT: HCPCS | Performed by: FAMILY MEDICINE

## 2017-11-20 RX ORDER — FAMOTIDINE 20 MG/1
20 TABLET ORAL 2 TIMES DAILY
COMMUNITY
End: 2018-04-10 | Stop reason: ALTCHOICE

## 2017-11-20 NOTE — PROGRESS NOTES
Here with loving sister. Since I seen her last she has had several hospitalizations in psychiatric facilities. She has a definite group of dedicated and interested psychiatric caregivers.   She no longer drives but does have adequate public transportation

## 2017-11-21 RX ORDER — POTASSIUM CHLORIDE 20 MEQ/1
TABLET, EXTENDED RELEASE ORAL
Qty: 14 TABLET | Refills: 5 | OUTPATIENT
Start: 2017-11-21

## 2017-11-22 RX ORDER — POTASSIUM CHLORIDE 20 MEQ/1
TABLET, EXTENDED RELEASE ORAL
Qty: 30 TABLET | Refills: 0 | Status: SHIPPED | OUTPATIENT
Start: 2017-11-22 | End: 2017-12-19

## 2017-12-01 NOTE — PROGRESS NOTES
Jeanette Fernandez is a 45year old female who presents for a Medicare Annual Wellness visit.     Patient Care Team: Patient Care Team:  Lucia Chaney DO as PCP - [facility])  Teresa Hernandez as Consulting Physician (Physician Annalee Armstrong 60 tablet Rfl: 1   Potassium Chloride ER 20 MEQ Oral Tab CR TAKE ONE TABLET BY MOUTH EVERY MORNING Disp: 30 tablet Rfl: 0     Wt Readings from Last 6 Encounters:  11/20/17 : 168 lb  11/16/17 : 165 lb  08/17/17 : 171 lb  04/26/17 : 181 lb  02/22/17 : 187 lb B, Tetanus, or Pneumococcal?: No     Functional Ability     Bathing or Showering: Able without help    Toileting: Able without help    Dressing: Able without help    Eating: Able without help    Driving: Need some help    Preparing your meals: Able without AND SCHEDULE Internal Lab or Procedure External Lab or Procedure   Diabetes Screening      HbgA1C   Annually HEMOGLOBIN A1c (% of total Hgb)   Date Value   07/29/2011 6.0 (H)    No flowsheet data found.     Fasting Blood Sugar (FSB)Annually   Glucose (mg/dL applicable    Hepatitis B No orders found for this or any previous visit. Update Immunization Activity if applicable    Tetanus No orders found for this or any previous visit.  Update Immunization Activity if applicable    Zoster (Not covered by Medicare [place] Tab Take 1 mg by mouth 3 (three) times daily. Disp:  Rfl:    haloperidol 5 MG Oral Tab Take 5 mg by mouth 2 (two) times daily. Disp:  Rfl:    TraZODone HCl 50 MG Oral Tab Take 50 mg by mouth daily.  Disp:  Rfl:    OMEGA-3-ACID ETHYL ESTERS 1 g Oral Cap TA TONSILLECTOMY   Family History   Problem Relation Age of Onset   • Diabetes Father      DM type 1   • Heart Disorder Father      MI   • Alcohol and Other Disorders Associated Father    • Heart Disorder Mother      MI   • Hypertension Mother    • Depression

## 2017-12-04 ENCOUNTER — PATIENT OUTREACH (OUTPATIENT)
Dept: CASE MANAGEMENT | Age: 52
End: 2017-12-04

## 2017-12-04 DIAGNOSIS — F25.9 SCHIZOAFFECTIVE DISORDER, CHRONIC CONDITION (HCC): ICD-10-CM

## 2017-12-04 DIAGNOSIS — F33.1 MAJOR DEPRESSIVE DISORDER, RECURRENT EPISODE, MODERATE (HCC): ICD-10-CM

## 2017-12-04 DIAGNOSIS — I10 ACCELERATED HYPERTENSION: ICD-10-CM

## 2017-12-04 PROCEDURE — 99490 CHRNC CARE MGMT STAFF 1ST 20: CPT

## 2017-12-04 NOTE — PROGRESS NOTES
Called and left a message for patient to call back when she can.      Time spent with patient 2 mins

## 2017-12-05 NOTE — PROGRESS NOTES
12/4/2017  Spoke to Alanna at length about CCM, current care plan and performed CCM assessment with Alanna reviewed meds and compliance.  Reviewed pt Accelerated hypertension  Major depressive disorder, recurrent episode, moderate (hcc)  [facility], [address] [place] [address], [address] [place] [id number] [phone]            Care Plan: Reviewed and updated where needed    Time Spent This Encounter Total: 26 min medical record review, te

## 2017-12-06 ENCOUNTER — BH HISTORICAL (OUTPATIENT)
Dept: OTHER | Age: 52
End: 2017-12-06

## 2017-12-18 DIAGNOSIS — E03.8 OTHER SPECIFIED HYPOTHYROIDISM: ICD-10-CM

## 2017-12-18 DIAGNOSIS — F33.1 MAJOR DEPRESSIVE DISORDER, RECURRENT EPISODE, MODERATE (HCC): ICD-10-CM

## 2017-12-18 RX ORDER — POTASSIUM CHLORIDE 20 MEQ/1
TABLET, EXTENDED RELEASE ORAL
Qty: 14 TABLET | Refills: 5 | Status: CANCELLED | OUTPATIENT
Start: 2017-12-18

## 2017-12-18 RX ORDER — LEVOTHYROXINE SODIUM 112 UG/1
TABLET ORAL
Qty: 14 TABLET | Refills: 5 | Status: SHIPPED | OUTPATIENT
Start: 2017-12-18 | End: 2018-02-21

## 2017-12-19 RX ORDER — POTASSIUM CHLORIDE 20 MEQ/1
TABLET, EXTENDED RELEASE ORAL
Qty: 30 TABLET | Refills: 0 | Status: SHIPPED | OUTPATIENT
Start: 2017-12-19 | End: 2018-03-12

## 2017-12-19 RX ORDER — POTASSIUM CHLORIDE 20 MEQ/1
TABLET, EXTENDED RELEASE ORAL
Qty: 14 TABLET | Refills: 5 | Status: SHIPPED | OUTPATIENT
Start: 2017-12-19 | End: 2018-09-13

## 2017-12-19 RX ORDER — CLONAZEPAM 0.5 MG/1
0.5 TABLET ORAL 2 TIMES DAILY PRN
Qty: 60 TABLET | Refills: 1 | Status: SHIPPED | OUTPATIENT
Start: 2017-12-19 | End: 2018-10-18 | Stop reason: ALTCHOICE

## 2017-12-19 NOTE — TELEPHONE ENCOUNTER
Last ov was  11/20/17  Last refill potassium chloride Er 20 mg 11/22/17    .   Potassium:    Lab Results  Component Value Date   K 3.7 10/17/2017   K 3.5 (L) 08/07/2017   K 4.2 02/22/2017

## 2017-12-20 ENCOUNTER — TELEPHONE (OUTPATIENT)
Dept: FAMILY MEDICINE CLINIC | Facility: CLINIC | Age: 52
End: 2017-12-20

## 2017-12-20 NOTE — TELEPHONE ENCOUNTER
CLARIFICATION ON DIRECTIONS FOR CLONAZEPAM.  LAST TIME IT WAS TO SCHEDULED. THIS TIME IT WAS WRITTEN AS NEEDED.

## 2018-01-02 ENCOUNTER — PATIENT OUTREACH (OUTPATIENT)
Dept: CASE MANAGEMENT | Age: 53
End: 2018-01-02

## 2018-01-02 NOTE — PROGRESS NOTES
1/2/2018  Spoke to Alanna at length about CCM, current care plan and performed CCM assessment with Alanna reviewed meds and compliance. Reviewed pt schizoafective disorder, hypertension, major depressive disorder. Called and spoke to patient.  Patient

## 2018-01-09 ENCOUNTER — TELEPHONE (OUTPATIENT)
Dept: FAMILY MEDICINE CLINIC | Facility: CLINIC | Age: 53
End: 2018-01-09

## 2018-01-09 ENCOUNTER — PATIENT OUTREACH (OUTPATIENT)
Dept: CASE MANAGEMENT | Age: 53
End: 2018-01-09

## 2018-01-09 DIAGNOSIS — F33.1 MAJOR DEPRESSIVE DISORDER, RECURRENT EPISODE, MODERATE (HCC): ICD-10-CM

## 2018-01-09 DIAGNOSIS — F25.9 SCHIZOAFFECTIVE DISORDER, CHRONIC CONDITION (HCC): ICD-10-CM

## 2018-01-09 DIAGNOSIS — I10 ACCELERATED HYPERTENSION: ICD-10-CM

## 2018-01-09 PROCEDURE — 99490 CHRNC CARE MGMT STAFF 1ST 20: CPT

## 2018-01-09 NOTE — TELEPHONE ENCOUNTER
Patient was suppose to get a bmp in one month patient did not do this but will go Friday please order any other labs?

## 2018-01-09 NOTE — PROGRESS NOTES
1/9/2018  Spoke to Alanna at length about CCM, current care plan and performed CCM assessment with Alanan reviewed meds and compliance. Reviewed pt schizoaffective disorder and hypertension. Patient Goals:   1.   Patient relates that she is working o and updated where needed    Time Spent This Encounter Total: 45 min medical record review, telephone communication, care plan updates where needed, and education. Provided acknowledgment and validation to patient's concerns.    Monthly Minute Total includin

## 2018-01-16 RX ORDER — VITAMIN B COMPLEX
TABLET ORAL
Qty: 14 TABLET | Refills: 5 | Status: SHIPPED | OUTPATIENT
Start: 2018-01-16 | End: 2018-03-29

## 2018-01-17 ENCOUNTER — BH HISTORICAL (OUTPATIENT)
Dept: OTHER | Age: 53
End: 2018-01-17

## 2018-01-17 ENCOUNTER — APPOINTMENT (OUTPATIENT)
Dept: LAB | Age: 53
End: 2018-01-17
Attending: FAMILY MEDICINE
Payer: MEDICARE

## 2018-01-17 DIAGNOSIS — E03.8 OTHER SPECIFIED HYPOTHYROIDISM: ICD-10-CM

## 2018-01-17 LAB
FREE T4: 1.8 NG/DL (ref 0.9–1.8)
TSI SER-ACNC: 1.45 MIU/ML (ref 0.35–5.5)

## 2018-01-17 PROCEDURE — 36415 COLL VENOUS BLD VENIPUNCTURE: CPT

## 2018-01-17 PROCEDURE — 84439 ASSAY OF FREE THYROXINE: CPT

## 2018-01-17 PROCEDURE — 84443 ASSAY THYROID STIM HORMONE: CPT

## 2018-01-29 ENCOUNTER — BH HISTORICAL (OUTPATIENT)
Dept: OTHER | Age: 53
End: 2018-01-29

## 2018-02-02 RX ORDER — OMEGA-3-ACID ETHYL ESTERS 1 G/1
CAPSULE, LIQUID FILLED ORAL
Qty: 28 CAPSULE | Refills: 5 | Status: SHIPPED | OUTPATIENT
Start: 2018-02-02 | End: 2018-04-23

## 2018-02-02 RX ORDER — FAMOTIDINE 40 MG/1
TABLET, FILM COATED ORAL
Qty: 28 TABLET | Refills: 5 | Status: SHIPPED | OUTPATIENT
Start: 2018-02-02 | End: 2018-04-10 | Stop reason: ALTCHOICE

## 2018-02-05 ENCOUNTER — BH HISTORICAL (OUTPATIENT)
Dept: OTHER | Age: 53
End: 2018-02-05

## 2018-02-06 ENCOUNTER — PATIENT OUTREACH (OUTPATIENT)
Dept: CASE MANAGEMENT | Age: 53
End: 2018-02-06

## 2018-02-06 DIAGNOSIS — I10 ACCELERATED HYPERTENSION: ICD-10-CM

## 2018-02-06 DIAGNOSIS — F33.1 MAJOR DEPRESSIVE DISORDER, RECURRENT EPISODE, MODERATE (HCC): ICD-10-CM

## 2018-02-06 DIAGNOSIS — F25.9 SCHIZOAFFECTIVE DISORDER, CHRONIC CONDITION (HCC): ICD-10-CM

## 2018-02-06 PROCEDURE — 99490 CHRNC CARE MGMT STAFF 1ST 20: CPT

## 2018-02-06 NOTE — PROGRESS NOTES
2/6/2018  Spoke to Kyla Hammans Patient power of attorny for [facility]. Updates to patient care team/ comments: None  Patient reported changes in medications: None   Med Adherence  Comment: Patient is taking all medication as prescribed.      Health Maintenance: P acknowledgment and validation to patient's concerns. Monthly Minute Total including today: 23    Physical assessment, complete health history, and care plan established by Nannette Gibson DO, need for CCM determined from these assessments and data.

## 2018-02-12 ENCOUNTER — BH HISTORICAL (OUTPATIENT)
Dept: OTHER | Age: 53
End: 2018-02-12

## 2018-02-21 DIAGNOSIS — E03.8 OTHER SPECIFIED HYPOTHYROIDISM: ICD-10-CM

## 2018-02-22 RX ORDER — LEVOTHYROXINE SODIUM 112 UG/1
TABLET ORAL
Qty: 14 TABLET | Refills: 5 | Status: SHIPPED | OUTPATIENT
Start: 2018-02-22 | End: 2018-04-10 | Stop reason: ALTCHOICE

## 2018-03-12 RX ORDER — POTASSIUM CHLORIDE 20 MEQ/1
TABLET, EXTENDED RELEASE ORAL
Qty: 14 TABLET | Refills: 5 | Status: SHIPPED | OUTPATIENT
Start: 2018-03-12 | End: 2018-09-13

## 2018-03-13 ENCOUNTER — PATIENT OUTREACH (OUTPATIENT)
Dept: CASE MANAGEMENT | Age: 53
End: 2018-03-13

## 2018-03-13 DIAGNOSIS — F25.9 SCHIZOAFFECTIVE DISORDER, CHRONIC CONDITION (HCC): ICD-10-CM

## 2018-03-13 DIAGNOSIS — F33.1 MAJOR DEPRESSIVE DISORDER, RECURRENT EPISODE, MODERATE (HCC): ICD-10-CM

## 2018-03-13 DIAGNOSIS — I10 ACCELERATED HYPERTENSION: ICD-10-CM

## 2018-03-13 PROCEDURE — 99490 CHRNC CARE MGMT STAFF 1ST 20: CPT

## 2018-03-13 NOTE — PROGRESS NOTES
Reviewed patient chart. Called and left a message for [facility] Patient POA as well as patient. To call me back when they get a chance.      Time spent 8 mins

## 2018-03-13 NOTE — PROGRESS NOTES
3/13/2018  Spoke to Alanna for CCM. Updates to patient care team/ comments: None  Patient reported changes in medications: Yes patient is off Haldol and Sertraline and is currently taking   Med Adherence  Comment: Patient is currently on Clozaril. plan updates where needed, education, goals and action plan recreation/update. Provided acknowledgment and validation to patient's concerns.    Monthly Minute Total including today: 33    Physical assessment, complete health history, and care plan establish

## 2018-03-23 RX ORDER — ERGOCALCIFEROL 1.25 MG/1
CAPSULE ORAL
Qty: 4 CAPSULE | Refills: 5 | OUTPATIENT
Start: 2018-03-23

## 2018-03-29 RX ORDER — VITAMIN B COMPLEX
TABLET ORAL
Qty: 14 TABLET | Refills: 5 | Status: SHIPPED | OUTPATIENT
Start: 2018-03-29 | End: 2018-07-02

## 2018-04-03 ENCOUNTER — BH HISTORICAL (OUTPATIENT)
Dept: OTHER | Age: 53
End: 2018-04-03

## 2018-04-03 RX ORDER — ERGOCALCIFEROL 1.25 MG/1
CAPSULE ORAL
Qty: 2 CAPSULE | Refills: 5 | Status: SHIPPED | OUTPATIENT
Start: 2018-04-03 | End: 2018-06-04

## 2018-04-03 RX ORDER — LEVOTHYROXINE SODIUM 0.1 MG/1
TABLET ORAL
Qty: 14 TABLET | Refills: 5 | Status: SHIPPED | OUTPATIENT
Start: 2018-04-03 | End: 2018-06-13

## 2018-04-06 RX ORDER — VITAMIN B COMPLEX
TABLET ORAL
Qty: 14 TABLET | Refills: 5 | OUTPATIENT
Start: 2018-04-06

## 2018-04-10 ENCOUNTER — OFFICE VISIT (OUTPATIENT)
Dept: FAMILY MEDICINE CLINIC | Facility: CLINIC | Age: 53
End: 2018-04-10

## 2018-04-10 VITALS
DIASTOLIC BLOOD PRESSURE: 86 MMHG | WEIGHT: 157 LBS | HEART RATE: 94 BPM | SYSTOLIC BLOOD PRESSURE: 126 MMHG | RESPIRATION RATE: 18 BRPM | OXYGEN SATURATION: 98 % | BODY MASS INDEX: 25.84 KG/M2 | TEMPERATURE: 98 F | HEIGHT: 65.5 IN

## 2018-04-10 DIAGNOSIS — K64.5 PERIANAL VENOUS THROMBOSIS: Primary | ICD-10-CM

## 2018-04-10 PROCEDURE — 99213 OFFICE O/P EST LOW 20 MIN: CPT | Performed by: FAMILY MEDICINE

## 2018-04-10 RX ORDER — NORTRIPTYLINE HYDROCHLORIDE 25 MG/1
CAPSULE ORAL
Refills: 0 | COMMUNITY
Start: 2018-04-08 | End: 2018-10-18 | Stop reason: ALTCHOICE

## 2018-04-10 NOTE — PROGRESS NOTES
Here with an uncomfortable sensation around the anus. No blood no change in stool pattern. No dysuria.   Has had hemorrhoids in the past today's exam abdomen is soft and nontender the entire anus anal canal and internal anal sphincter region is [employer]

## 2018-04-12 ENCOUNTER — PATIENT OUTREACH (OUTPATIENT)
Dept: CASE MANAGEMENT | Age: 53
End: 2018-04-12

## 2018-04-12 NOTE — PROGRESS NOTES
Reviewed chart. Called and left a message for patient to call back when she can.      Time spent 5 mins

## 2018-04-23 RX ORDER — OMEGA-3-ACID ETHYL ESTERS 1 G/1
CAPSULE, LIQUID FILLED ORAL
Qty: 28 CAPSULE | Refills: 5 | Status: SHIPPED | OUTPATIENT
Start: 2018-04-23 | End: 2018-07-02

## 2018-04-25 ENCOUNTER — PATIENT OUTREACH (OUTPATIENT)
Dept: CASE MANAGEMENT | Age: 53
End: 2018-04-25

## 2018-04-25 DIAGNOSIS — F25.9 SCHIZOAFFECTIVE DISORDER, CHRONIC CONDITION (HCC): ICD-10-CM

## 2018-04-25 DIAGNOSIS — F33.1 MAJOR DEPRESSIVE DISORDER, RECURRENT EPISODE, MODERATE (HCC): ICD-10-CM

## 2018-04-25 DIAGNOSIS — I10 ACCELERATED HYPERTENSION: ICD-10-CM

## 2018-04-25 PROCEDURE — 99490 CHRNC CARE MGMT STAFF 1ST 20: CPT

## 2018-04-26 NOTE — PROGRESS NOTES
4/25/2018  Spoke to Marin Lord for CCM. Updates to patient care team/ comments: None  Patient reported changes in medications: None  Med Adherence  Comment: Patient relates taking medication as prescribed.      Health Maintenance:   Pap Smear,3 Years due

## 2018-05-10 ENCOUNTER — TELEPHONE (OUTPATIENT)
Dept: FAMILY MEDICINE CLINIC | Facility: CLINIC | Age: 53
End: 2018-05-10

## 2018-05-10 NOTE — TELEPHONE ENCOUNTER
Pharmacist states they have prescriptions dated 5-9-18 from [facility]. Advised pharmacist we do not have any records from [facility] [place] [facility] on this hospitalization. 3 RXs are the same dose/directions: Potassium, Levothyroxine, Vit D.  But 2 medicati

## 2018-05-10 NOTE — TELEPHONE ENCOUNTER
Pharmacist called to request for the nurse to called them back they have a few things they need to discuss regarding pt's medications. Please call and advise, pharmacist mentioned the pt is currently on a assisted living. Please call and advise.

## 2018-05-17 ENCOUNTER — TELEPHONE (OUTPATIENT)
Dept: FAMILY MEDICINE CLINIC | Facility: CLINIC | Age: 53
End: 2018-05-17

## 2018-05-22 ENCOUNTER — PATIENT OUTREACH (OUTPATIENT)
Dept: CASE MANAGEMENT | Age: 53
End: 2018-05-22

## 2018-05-22 NOTE — PROGRESS NOTES
5/22/2018  Called patient and left a message for patient to call back when he can.      Time Spent This Encounter Total: 5 min medical record review, telephone communication, care plan updates where needed, education, goals and action plan recreation/update

## 2018-05-31 NOTE — TELEPHONE ENCOUNTER
[id number]    PC with Petra, after discussion with Dr. Purvis, she does not need to be seen prior to Saturday as long as she is not having any problems.    She states understanding, no questions at this time.    Patient has appt 8/17/17. Last labs 2/22/17. What labs do you want ordered?

## 2018-06-04 RX ORDER — ERGOCALCIFEROL 1.25 MG/1
CAPSULE ORAL
Qty: 2 CAPSULE | Refills: 5 | Status: SHIPPED | OUTPATIENT
Start: 2018-06-04 | End: 2018-08-31

## 2018-06-04 RX ORDER — POTASSIUM CHLORIDE 20 MEQ/1
TABLET, EXTENDED RELEASE ORAL
Qty: 14 TABLET | Refills: 5 | Status: SHIPPED | OUTPATIENT
Start: 2018-06-04 | End: 2018-09-13

## 2018-06-06 ENCOUNTER — TELEPHONE (OUTPATIENT)
Dept: FAMILY MEDICINE CLINIC | Facility: CLINIC | Age: 53
End: 2018-06-06

## 2018-06-06 NOTE — TELEPHONE ENCOUNTER
Holly needs clarification for vitamin d and omega 3 for pt  Pt is out of medication and needs to get it filled today  Please advise

## 2018-06-07 ENCOUNTER — PATIENT OUTREACH (OUTPATIENT)
Dept: CASE MANAGEMENT | Age: 53
End: 2018-06-07

## 2018-06-07 NOTE — PROGRESS NOTES
6/7/2018  Called patient and left a message for patient to call back     Time Spent This Encounter Total: 5 min medical record review, telephone communication, care plan updates where needed, education, goals and action plan recreation/update.  Provided ack

## 2018-06-08 ENCOUNTER — TELEPHONE (OUTPATIENT)
Dept: FAMILY MEDICINE CLINIC | Facility: CLINIC | Age: 53
End: 2018-06-08

## 2018-06-11 ENCOUNTER — PATIENT OUTREACH (OUTPATIENT)
Dept: CASE MANAGEMENT | Age: 53
End: 2018-06-11

## 2018-06-11 NOTE — PROGRESS NOTES
6/11/2018  Called patient and left a message for patient to call back when she can.      Time Spent This Encounter Total: 2 min medical record review, telephone communication, care plan updates where needed, education, goals and action plan recreation/updat

## 2018-06-14 RX ORDER — LEVOTHYROXINE SODIUM 0.1 MG/1
TABLET ORAL
Qty: 14 TABLET | Refills: 5 | Status: SHIPPED | OUTPATIENT
Start: 2018-06-14 | End: 2018-10-18 | Stop reason: ALTCHOICE

## 2018-06-18 ENCOUNTER — TELEPHONE (OUTPATIENT)
Dept: FAMILY MEDICINE CLINIC | Facility: CLINIC | Age: 53
End: 2018-06-18

## 2018-06-18 NOTE — TELEPHONE ENCOUNTER
[employer] called on be hlaf of Dr Jaime Winchester Patient Winnie Pham.  She was admitted into [facility] On Friday 6/15/2018

## 2018-06-22 ENCOUNTER — PATIENT OUTREACH (OUTPATIENT)
Dept: CASE MANAGEMENT | Age: 53
End: 2018-06-22

## 2018-06-22 DIAGNOSIS — I10 ACCELERATED HYPERTENSION: ICD-10-CM

## 2018-06-22 DIAGNOSIS — F25.9 SCHIZOAFFECTIVE DISORDER, CHRONIC CONDITION (HCC): ICD-10-CM

## 2018-06-22 DIAGNOSIS — F33.1 MAJOR DEPRESSIVE DISORDER, RECURRENT EPISODE, MODERATE (HCC): ICD-10-CM

## 2018-06-22 DIAGNOSIS — M50.30 DEGENERATIVE DISC DISEASE, CERVICAL: ICD-10-CM

## 2018-06-22 NOTE — PROGRESS NOTES
6/22/2018  Spoke to [facility] for CCM. Updates to patient care team/ comments: None  Patient reported changes in medications: None  Med Adherence  Comment: Pt is taking medication as prescribed.      Health Maintenance:   Pap Smear,3 Years due on 01/27/1

## 2018-06-29 ENCOUNTER — TELEPHONE (OUTPATIENT)
Dept: FAMILY MEDICINE CLINIC | Facility: CLINIC | Age: 53
End: 2018-06-29

## 2018-06-30 PROCEDURE — 99490 CHRNC CARE MGMT STAFF 1ST 20: CPT

## 2018-07-02 ENCOUNTER — TELEPHONE (OUTPATIENT)
Dept: FAMILY MEDICINE CLINIC | Facility: CLINIC | Age: 53
End: 2018-07-02

## 2018-07-02 RX ORDER — VITAMIN B COMPLEX
TABLET ORAL
Qty: 14 TABLET | Refills: 5 | Status: SHIPPED | OUTPATIENT
Start: 2018-07-02 | End: 2021-09-08 | Stop reason: ALTCHOICE

## 2018-07-02 RX ORDER — OMEGA-3-ACID ETHYL ESTERS 1 G/1
CAPSULE, LIQUID FILLED ORAL
Qty: 28 CAPSULE | Refills: 5 | Status: SHIPPED | OUTPATIENT
Start: 2018-07-02 | End: 2018-07-06

## 2018-07-02 NOTE — TELEPHONE ENCOUNTER
Calling to say that patient was admitted to [facility] for behavioral health yesterday. Wants to discuss her case and her status. Pls call her back.

## 2018-07-02 NOTE — TELEPHONE ENCOUNTER
Case coordinator called, states pt is at [facility] in [place]. She thinks this may not be a good fit for the patient, as she has been hospitalized 5 times this year.   Pt may need another facility as her mental status seems to be declinin

## 2018-07-06 RX ORDER — OMEGA-3-ACID ETHYL ESTERS 1 G/1
CAPSULE, LIQUID FILLED ORAL
Qty: 28 CAPSULE | Refills: 5 | Status: SHIPPED | OUTPATIENT
Start: 2018-07-06 | End: 2018-08-09

## 2018-07-19 ENCOUNTER — PATIENT OUTREACH (OUTPATIENT)
Dept: CASE MANAGEMENT | Age: 53
End: 2018-07-19

## 2018-07-19 DIAGNOSIS — F33.1 MAJOR DEPRESSIVE DISORDER, RECURRENT EPISODE, MODERATE (HCC): ICD-10-CM

## 2018-07-19 DIAGNOSIS — M50.30 DEGENERATIVE DISC DISEASE, CERVICAL: ICD-10-CM

## 2018-07-19 DIAGNOSIS — F25.9 SCHIZOAFFECTIVE DISORDER, CHRONIC CONDITION (HCC): ICD-10-CM

## 2018-07-19 NOTE — PROGRESS NOTES
7/19/2018  Spoke to Alanna for CCM. Updates to patient care team/ comments: none  Patient reported changes in medications: none  Med Adherence  Comment: Patient relates taking medication as prescribed.      Health Maintenance:   Colonoscopy due on 01/

## 2018-07-31 PROCEDURE — 99490 CHRNC CARE MGMT STAFF 1ST 20: CPT

## 2018-08-07 ENCOUNTER — PATIENT OUTREACH (OUTPATIENT)
Dept: CASE MANAGEMENT | Age: 53
End: 2018-08-07

## 2018-08-07 DIAGNOSIS — M50.30 DEGENERATIVE DISC DISEASE, CERVICAL: ICD-10-CM

## 2018-08-07 DIAGNOSIS — R90.89 ABNORMAL FINDING ON MRI OF BRAIN: ICD-10-CM

## 2018-08-07 DIAGNOSIS — F33.1 MAJOR DEPRESSIVE DISORDER, RECURRENT EPISODE, MODERATE (HCC): ICD-10-CM

## 2018-08-07 DIAGNOSIS — F25.9 SCHIZOAFFECTIVE DISORDER, CHRONIC CONDITION (HCC): ICD-10-CM

## 2018-08-07 NOTE — PROGRESS NOTES
Called patient and left a message for patient to call back when she can.     Time spent 5 mins     Time spent this month 5 mins

## 2018-08-07 NOTE — PROGRESS NOTES
8/7/2018  Spoke to Alanna for CCM.       Updates to patient care team/ comments: None  Patient reported changes in medications: None  Med Adherence  Comment: Patient relates taking medication as perscribed    Health Maintenance:     Colonoscopy due on 01/2

## 2018-08-10 RX ORDER — OMEGA-3-ACID ETHYL ESTERS 1 G/1
CAPSULE, LIQUID FILLED ORAL
Qty: 18 CAPSULE | Refills: 5 | Status: SHIPPED | OUTPATIENT
Start: 2018-08-10 | End: 2018-10-08

## 2018-08-10 NOTE — PROGRESS NOTES
8/1/2017  Spoke to Alnana at length about CCM, current care plan and performed CCM assessment with Alanna reviewed meds and compliance.  Reviewed pt Patient Active Problem List:     Herpes zoster without mention of complication     Major depressive disord [address] [facility]  [address] [facility],[address], [address] [facility] [address]  [address]  [address], [address], [address] [facility] [address]  [address] [facility], [place] [phone] (4) no limitation

## 2018-08-13 RX ORDER — CHLORPROMAZINE HYDROCHLORIDE 50 MG/1
TABLET, FILM COATED ORAL
Qty: 9 TABLET | Refills: 5 | Status: SHIPPED | OUTPATIENT
Start: 2018-08-13 | End: 2019-03-29 | Stop reason: ALTCHOICE

## 2018-08-13 RX ORDER — BENZTROPINE MESYLATE 0.5 MG/1
TABLET ORAL
Qty: 6 TABLET | Refills: 5 | Status: SHIPPED | OUTPATIENT
Start: 2018-08-13 | End: 2018-10-18 | Stop reason: ALTCHOICE

## 2018-08-13 RX ORDER — LEVOTHYROXINE SODIUM 0.15 MG/1
TABLET ORAL
Qty: 3 TABLET | Refills: 5 | Status: SHIPPED | OUTPATIENT
Start: 2018-08-13 | End: 2019-01-14

## 2018-08-31 PROCEDURE — 99490 CHRNC CARE MGMT STAFF 1ST 20: CPT

## 2018-08-31 RX ORDER — POTASSIUM CHLORIDE 20 MEQ/1
TABLET, EXTENDED RELEASE ORAL
Qty: 14 TABLET | Refills: 5 | Status: SHIPPED | OUTPATIENT
Start: 2018-08-31 | End: 2019-03-29

## 2018-08-31 RX ORDER — ERGOCALCIFEROL 1.25 MG/1
CAPSULE ORAL
Qty: 2 CAPSULE | Refills: 5 | Status: SHIPPED | OUTPATIENT
Start: 2018-08-31 | End: 2018-11-20

## 2018-09-04 NOTE — TELEPHONE ENCOUNTER
Patient needs to make an appointment to see Dr. Eva Presley for a follow up. Patient would like labs placed in the system. Please place. Triage send back to me so I can let them know the orders have been placed thank you!  [facility]

## 2018-09-05 ENCOUNTER — PATIENT OUTREACH (OUTPATIENT)
Dept: CASE MANAGEMENT | Age: 53
End: 2018-09-05

## 2018-09-05 DIAGNOSIS — M50.30 DEGENERATIVE DISC DISEASE, CERVICAL: ICD-10-CM

## 2018-09-05 DIAGNOSIS — F25.9 SCHIZOAFFECTIVE DISORDER, CHRONIC CONDITION (HCC): ICD-10-CM

## 2018-09-05 DIAGNOSIS — F33.1 MAJOR DEPRESSIVE DISORDER, RECURRENT EPISODE, MODERATE (HCC): ICD-10-CM

## 2018-09-05 DIAGNOSIS — I10 ACCELERATED HYPERTENSION: ICD-10-CM

## 2018-09-05 NOTE — PROGRESS NOTES
9/5/2018  Spoke to Humphrey Lancaster for CCM. Updates to patient care team/ comments: None  Patient reported changes in medications: None  Med Adherence  Comment: Patient relates taking medication as prescribed.      Health Maintenance:   Colonoscopy due on 01/2 confident in achieving goal, 5= very confident               - confidence: : 3    Care Manager Follow Up: Af ew days   Reason For Follow Up: review progress and or barriers towards patients goals.      Time Spent This Encounter Total: 25 min medical record

## 2018-09-13 ENCOUNTER — OFFICE VISIT (OUTPATIENT)
Dept: FAMILY MEDICINE CLINIC | Facility: CLINIC | Age: 53
End: 2018-09-13
Payer: MEDICAID

## 2018-09-13 VITALS
RESPIRATION RATE: 20 BRPM | OXYGEN SATURATION: 99 % | TEMPERATURE: 98 F | SYSTOLIC BLOOD PRESSURE: 102 MMHG | HEIGHT: 66.5 IN | DIASTOLIC BLOOD PRESSURE: 62 MMHG | HEART RATE: 102 BPM | WEIGHT: 157 LBS | BODY MASS INDEX: 24.93 KG/M2

## 2018-09-13 VITALS — HEIGHT: 66.5 IN | BODY MASS INDEX: 25 KG/M2

## 2018-09-13 DIAGNOSIS — Z12.11 ENCOUNTER FOR SCREENING FECAL OCCULT BLOOD TESTING: ICD-10-CM

## 2018-09-13 DIAGNOSIS — Z01.419 WELL WOMAN EXAM: Primary | ICD-10-CM

## 2018-09-13 DIAGNOSIS — Z12.4 SCREENING FOR CERVICAL CANCER: ICD-10-CM

## 2018-09-13 DIAGNOSIS — Z12.31 ENCOUNTER FOR SCREENING MAMMOGRAM FOR MALIGNANT NEOPLASM OF BREAST: ICD-10-CM

## 2018-09-13 DIAGNOSIS — I10 ESSENTIAL HYPERTENSION: Primary | ICD-10-CM

## 2018-09-13 PROCEDURE — 99396 PREV VISIT EST AGE 40-64: CPT | Performed by: INTERNAL MEDICINE

## 2018-09-13 PROCEDURE — 87624 HPV HI-RISK TYP POOLED RSLT: CPT | Performed by: INTERNAL MEDICINE

## 2018-09-13 PROCEDURE — 88175 CYTOPATH C/V AUTO FLUID REDO: CPT | Performed by: INTERNAL MEDICINE

## 2018-09-13 PROCEDURE — 87625 HPV TYPES 16 & 18 ONLY: CPT | Performed by: INTERNAL MEDICINE

## 2018-09-13 NOTE — PROGRESS NOTES
HPI:   Florentin Otoole is a 52year old female who presents for a well woman exam. Symptoms: denies discharge, itching, burning or dysuria, is menopausal.    No LMP recorded. Patient is not currently having periods (Reason: Menopause).   Previous pap: man conditions classified elsewhere and of unspecified site    • Person with feared complaint in whom no diagnosis was made    • Personal history of methicillin resistant Staphylococcus aureus    • Rhabdomyolysis    • Screening for diabetes mellitus    • Katia thyroid history, denies excessive thirst, denies significant weight change  ALL/ASTHMA: denies hx of allergy or asthma    EXAM:   /62   Pulse 102   Temp 98.2 °F (36.8 °C) (Oral)   Resp 20   Ht 66.5\"   Wt 157 lb   SpO2 99%   BMI 24.96 kg/m²       Bod

## 2018-09-14 LAB — HPV I/H RISK 1 DNA SPEC QL NAA+PROBE: POSITIVE

## 2018-09-16 LAB
HPV16 DNA CVX QL PROBE+SIG AMP: NEGATIVE
HPV18 DNA CVX QL PROBE+SIG AMP: NEGATIVE

## 2018-09-19 ENCOUNTER — PATIENT OUTREACH (OUTPATIENT)
Dept: CASE MANAGEMENT | Age: 53
End: 2018-09-19

## 2018-09-25 ENCOUNTER — APPOINTMENT (OUTPATIENT)
Dept: LAB | Facility: HOSPITAL | Age: 53
End: 2018-09-25
Attending: FAMILY MEDICINE
Payer: MEDICARE

## 2018-09-25 PROCEDURE — 82274 ASSAY TEST FOR BLOOD FECAL: CPT | Performed by: INTERNAL MEDICINE

## 2018-09-27 ENCOUNTER — HOSPITAL ENCOUNTER (OUTPATIENT)
Dept: MAMMOGRAPHY | Age: 53
Discharge: HOME | End: 2018-09-27
Attending: INTERNAL MEDICINE
Payer: MEDICARE

## 2018-09-27 ENCOUNTER — HOSPITAL ENCOUNTER (OUTPATIENT)
Dept: MAMMOGRAPHY | Age: 53
End: 2018-09-27
Attending: INTERNAL MEDICINE
Payer: MEDICARE

## 2018-09-27 DIAGNOSIS — Z12.31 ENCOUNTER FOR SCREENING MAMMOGRAM FOR MALIGNANT NEOPLASM OF BREAST: ICD-10-CM

## 2018-09-27 PROCEDURE — 77067 SCR MAMMO BI INCL CAD: CPT | Performed by: INTERNAL MEDICINE

## 2018-09-27 PROCEDURE — 77063 BREAST TOMOSYNTHESIS BI: CPT | Performed by: INTERNAL MEDICINE

## 2018-09-30 PROCEDURE — 99490 CHRNC CARE MGMT STAFF 1ST 20: CPT

## 2018-10-08 RX ORDER — OMEGA-3-ACID ETHYL ESTERS 1 G/1
CAPSULE, LIQUID FILLED ORAL
Qty: 28 CAPSULE | Refills: 5 | Status: SHIPPED | OUTPATIENT
Start: 2018-10-08 | End: 2019-01-02

## 2018-10-09 ENCOUNTER — PATIENT OUTREACH (OUTPATIENT)
Dept: CASE MANAGEMENT | Age: 53
End: 2018-10-09

## 2018-10-09 DIAGNOSIS — F33.1 MAJOR DEPRESSIVE DISORDER, RECURRENT EPISODE, MODERATE (HCC): ICD-10-CM

## 2018-10-09 DIAGNOSIS — F25.9 SCHIZOAFFECTIVE DISORDER, CHRONIC CONDITION (HCC): ICD-10-CM

## 2018-10-09 DIAGNOSIS — R90.89 ABNORMAL FINDING ON MRI OF BRAIN: ICD-10-CM

## 2018-10-09 NOTE — PROGRESS NOTES
10/9/2018  Spoke to Alanna for CCM.       Updates to patient care team/ comments: none   Patient reported changes in medications: none   Med Adherence  Comment: Pt relates taking medication as needed    Health Maintenance:   Colonoscopy due on 01/27/1965 medical record review, telephone communication, care plan updates where needed, education, goals and action plan recreation/update. Provided acknowledgment and validation to patient's concerns.    Monthly Minute Total including today: 25         Physical as

## 2018-10-18 ENCOUNTER — OFFICE VISIT (OUTPATIENT)
Dept: FAMILY MEDICINE CLINIC | Facility: CLINIC | Age: 53
End: 2018-10-18
Payer: MEDICAID

## 2018-10-18 VITALS
RESPIRATION RATE: 16 BRPM | WEIGHT: 153 LBS | SYSTOLIC BLOOD PRESSURE: 136 MMHG | HEIGHT: 66.5 IN | TEMPERATURE: 98 F | HEART RATE: 102 BPM | DIASTOLIC BLOOD PRESSURE: 86 MMHG | BODY MASS INDEX: 24.3 KG/M2 | OXYGEN SATURATION: 98 %

## 2018-10-18 DIAGNOSIS — K52.9 CHRONIC DIARRHEA: Primary | ICD-10-CM

## 2018-10-18 PROCEDURE — 99212 OFFICE O/P EST SF 10 MIN: CPT | Performed by: FAMILY MEDICINE

## 2018-10-18 RX ORDER — TRAZODONE HYDROCHLORIDE 100 MG/1
100 TABLET ORAL NIGHTLY
Refills: 4 | COMMUNITY
Start: 2018-10-07

## 2018-10-18 RX ORDER — OLANZAPINE 5 MG/1
15 TABLET ORAL
Refills: 2 | COMMUNITY
Start: 2018-10-16 | End: 2021-09-08

## 2018-10-18 RX ORDER — LORAZEPAM 0.5 MG/1
TABLET ORAL
Refills: 0 | COMMUNITY
Start: 2018-10-02 | End: 2019-03-29 | Stop reason: ALTCHOICE

## 2018-10-18 RX ORDER — DULOXETIN HYDROCHLORIDE 60 MG/1
60 CAPSULE, DELAYED RELEASE ORAL 2 TIMES DAILY
Refills: 2 | COMMUNITY
Start: 2018-10-16

## 2018-10-18 RX ORDER — HALOPERIDOL 10 MG/1
TABLET ORAL
Refills: 4 | COMMUNITY
Start: 2018-10-07 | End: 2019-07-29

## 2018-10-18 RX ORDER — LOPERAMIDE HYDROCHLORIDE 2 MG/1
2 CAPSULE ORAL 2 TIMES DAILY PRN
Qty: 60 CAPSULE | Refills: 3 | Status: SHIPPED | OUTPATIENT
Start: 2018-10-18 | End: 2021-09-08 | Stop reason: ALTCHOICE

## 2018-10-18 NOTE — PROGRESS NOTES
Here with sister. Complains of years worth of chronic watery uncontrolled diarrhea. She has been in and out of hospitals and mental health facilities for many years.   Her weight is remained stable however and she presents today with normal vital signs sh

## 2018-10-31 PROCEDURE — 99490 CHRNC CARE MGMT STAFF 1ST 20: CPT

## 2018-11-12 ENCOUNTER — TELEPHONE (OUTPATIENT)
Dept: FAMILY MEDICINE CLINIC | Facility: CLINIC | Age: 53
End: 2018-11-12

## 2018-11-13 ENCOUNTER — TELEPHONE (OUTPATIENT)
Dept: FAMILY MEDICINE CLINIC | Facility: CLINIC | Age: 53
End: 2018-11-13

## 2018-11-13 NOTE — TELEPHONE ENCOUNTER
WANTS TO KNOW IF DR ALARCON GOT THE FAX YESTERDAY OF PATIENTS LABS. PLS CALL BACK TO ADVISE EITHER WAY. WANTS TO MAKE SURE WE GOT THEM.

## 2018-11-14 ENCOUNTER — PATIENT OUTREACH (OUTPATIENT)
Dept: CASE MANAGEMENT | Age: 53
End: 2018-11-14

## 2018-11-14 DIAGNOSIS — F25.9 SCHIZOAFFECTIVE DISORDER, CHRONIC CONDITION (HCC): ICD-10-CM

## 2018-11-14 DIAGNOSIS — M50.30 DEGENERATIVE DISC DISEASE, CERVICAL: ICD-10-CM

## 2018-11-14 DIAGNOSIS — F33.1 MAJOR DEPRESSIVE DISORDER, RECURRENT EPISODE, MODERATE (HCC): ICD-10-CM

## 2018-11-14 NOTE — PROGRESS NOTES
11/14/2018  Spoke to Alanna for CCM. Updates to patient care team/ comments: None  Patient reported changes in medications: None  Med Adherence  Comment: Patient relates taking medication as prescribed.      Health Maintenance:     Colonoscopy due on

## 2018-11-20 RX ORDER — ERGOCALCIFEROL 1.25 MG/1
CAPSULE ORAL
Qty: 2 CAPSULE | Refills: 5 | Status: SHIPPED | OUTPATIENT
Start: 2018-11-20 | End: 2019-02-11

## 2018-11-20 RX ORDER — POTASSIUM CHLORIDE 20 MEQ/1
TABLET, EXTENDED RELEASE ORAL
Qty: 14 TABLET | Refills: 5 | Status: SHIPPED | OUTPATIENT
Start: 2018-11-20 | End: 2019-03-29

## 2018-11-30 PROCEDURE — 99490 CHRNC CARE MGMT STAFF 1ST 20: CPT

## 2018-12-20 ENCOUNTER — PATIENT OUTREACH (OUTPATIENT)
Dept: CASE MANAGEMENT | Age: 53
End: 2018-12-20

## 2019-01-02 RX ORDER — OMEGA-3-ACID ETHYL ESTERS 1 G/1
CAPSULE, LIQUID FILLED ORAL
Qty: 28 CAPSULE | Refills: 5 | Status: SHIPPED | OUTPATIENT
Start: 2019-01-02 | End: 2019-03-26

## 2019-01-14 RX ORDER — LEVOTHYROXINE SODIUM 0.15 MG/1
TABLET ORAL
Qty: 14 TABLET | Refills: 5 | Status: SHIPPED | OUTPATIENT
Start: 2019-01-14 | End: 2019-05-29 | Stop reason: ALTCHOICE

## 2019-01-31 ENCOUNTER — PATIENT OUTREACH (OUTPATIENT)
Dept: CASE MANAGEMENT | Age: 54
End: 2019-01-31

## 2019-02-11 RX ORDER — POTASSIUM CHLORIDE 20 MEQ/1
TABLET, EXTENDED RELEASE ORAL
Qty: 14 TABLET | Refills: 5 | Status: SHIPPED | OUTPATIENT
Start: 2019-02-11 | End: 2019-05-29 | Stop reason: ALTCHOICE

## 2019-02-11 RX ORDER — ERGOCALCIFEROL 1.25 MG/1
CAPSULE ORAL
Qty: 2 CAPSULE | Refills: 5 | Status: SHIPPED | OUTPATIENT
Start: 2019-02-11 | End: 2019-05-06

## 2019-03-26 RX ORDER — OMEGA-3-ACID ETHYL ESTERS 1 G/1
CAPSULE, LIQUID FILLED ORAL
Qty: 28 CAPSULE | Refills: 5 | Status: SHIPPED | OUTPATIENT
Start: 2019-03-26 | End: 2019-06-17

## 2019-03-29 ENCOUNTER — OFFICE VISIT (OUTPATIENT)
Dept: FAMILY MEDICINE CLINIC | Facility: CLINIC | Age: 54
End: 2019-03-29
Payer: MEDICARE

## 2019-03-29 ENCOUNTER — APPOINTMENT (OUTPATIENT)
Dept: LAB | Age: 54
End: 2019-03-29
Attending: FAMILY MEDICINE
Payer: MEDICARE

## 2019-03-29 VITALS
HEART RATE: 94 BPM | DIASTOLIC BLOOD PRESSURE: 82 MMHG | OXYGEN SATURATION: 98 % | WEIGHT: 160 LBS | BODY MASS INDEX: 25 KG/M2 | SYSTOLIC BLOOD PRESSURE: 124 MMHG | RESPIRATION RATE: 16 BRPM

## 2019-03-29 DIAGNOSIS — Z00.00 GENERAL MEDICAL EXAM: ICD-10-CM

## 2019-03-29 DIAGNOSIS — E03.9 HYPOTHYROIDISM, UNSPECIFIED TYPE: Primary | ICD-10-CM

## 2019-03-29 DIAGNOSIS — E55.9 HYPOVITAMINOSIS D: ICD-10-CM

## 2019-03-29 DIAGNOSIS — Z02.9 ENCOUNTERS FOR UNSPECIFIED ADMINISTRATIVE PURPOSE: Primary | ICD-10-CM

## 2019-03-29 LAB
ALBUMIN SERPL-MCNC: 3.9 G/DL (ref 3.4–5)
ALBUMIN/GLOB SERPL: 1.3 {RATIO} (ref 1–2)
ALP LIVER SERPL-CCNC: 76 U/L (ref 46–118)
ALT SERPL-CCNC: 33 U/L (ref 13–56)
ANION GAP SERPL CALC-SCNC: 8 MMOL/L (ref 0–18)
AST SERPL-CCNC: 16 U/L (ref 15–37)
BASOPHILS # BLD AUTO: 0.04 X10(3) UL (ref 0–0.2)
BASOPHILS NFR BLD AUTO: 0.7 %
BILIRUB SERPL-MCNC: 0.7 MG/DL (ref 0.1–2)
BUN BLD-MCNC: 17 MG/DL (ref 7–18)
BUN/CREAT SERPL: 18.3 (ref 10–20)
CALCIUM BLD-MCNC: 9.2 MG/DL (ref 8.5–10.1)
CHLORIDE SERPL-SCNC: 106 MMOL/L (ref 98–107)
CO2 SERPL-SCNC: 27 MMOL/L (ref 21–32)
CREAT BLD-MCNC: 0.93 MG/DL (ref 0.55–1.02)
DEPRECATED RDW RBC AUTO: 41.1 FL (ref 35.1–46.3)
EOSINOPHIL # BLD AUTO: 0.11 X10(3) UL (ref 0–0.7)
EOSINOPHIL NFR BLD AUTO: 1.9 %
ERYTHROCYTE [DISTWIDTH] IN BLOOD BY AUTOMATED COUNT: 12.4 % (ref 11–15)
EST. AVERAGE GLUCOSE BLD GHB EST-MCNC: 111 MG/DL (ref 68–126)
GLOBULIN PLAS-MCNC: 3.1 G/DL (ref 2.8–4.4)
GLUCOSE BLD-MCNC: 83 MG/DL (ref 70–99)
HBA1C MFR BLD HPLC: 5.5 % (ref ?–5.7)
HCT VFR BLD AUTO: 39.1 % (ref 35–48)
HGB BLD-MCNC: 13 G/DL (ref 12–16)
IMM GRANULOCYTES # BLD AUTO: 0.03 X10(3) UL (ref 0–1)
IMM GRANULOCYTES NFR BLD: 0.5 %
LYMPHOCYTES # BLD AUTO: 1.9 X10(3) UL (ref 1–4)
LYMPHOCYTES NFR BLD AUTO: 32 %
M PROTEIN MFR SERPL ELPH: 7 G/DL (ref 6.4–8.2)
MCH RBC QN AUTO: 30.4 PG (ref 26–34)
MCHC RBC AUTO-ENTMCNC: 33.2 G/DL (ref 31–37)
MCV RBC AUTO: 91.6 FL (ref 80–100)
MONOCYTES # BLD AUTO: 0.55 X10(3) UL (ref 0.1–1)
MONOCYTES NFR BLD AUTO: 9.3 %
NEUTROPHILS # BLD AUTO: 3.3 X10 (3) UL (ref 1.5–7.7)
NEUTROPHILS # BLD AUTO: 3.3 X10(3) UL (ref 1.5–7.7)
NEUTROPHILS NFR BLD AUTO: 55.6 %
OSMOLALITY SERPL CALC.SUM OF ELEC: 293 MOSM/KG (ref 275–295)
PLATELET # BLD AUTO: 384 10(3)UL (ref 150–450)
POTASSIUM SERPL-SCNC: 4 MMOL/L (ref 3.5–5.1)
RBC # BLD AUTO: 4.27 X10(6)UL (ref 3.8–5.3)
SODIUM SERPL-SCNC: 141 MMOL/L (ref 136–145)
T4 FREE SERPL-MCNC: 1.6 NG/DL (ref 0.8–1.7)
TSI SER-ACNC: 0.01 MIU/ML (ref 0.36–3.74)
VIT D+METAB SERPL-MCNC: 51.1 NG/ML (ref 30–100)
WBC # BLD AUTO: 5.9 X10(3) UL (ref 4–11)

## 2019-03-29 PROCEDURE — 83036 HEMOGLOBIN GLYCOSYLATED A1C: CPT | Performed by: FAMILY MEDICINE

## 2019-03-29 PROCEDURE — 85025 COMPLETE CBC W/AUTO DIFF WBC: CPT | Performed by: FAMILY MEDICINE

## 2019-03-29 PROCEDURE — 84439 ASSAY OF FREE THYROXINE: CPT | Performed by: FAMILY MEDICINE

## 2019-03-29 PROCEDURE — 84443 ASSAY THYROID STIM HORMONE: CPT | Performed by: FAMILY MEDICINE

## 2019-03-29 PROCEDURE — 80053 COMPREHEN METABOLIC PANEL: CPT | Performed by: FAMILY MEDICINE

## 2019-03-29 PROCEDURE — 99213 OFFICE O/P EST LOW 20 MIN: CPT | Performed by: FAMILY MEDICINE

## 2019-03-29 PROCEDURE — 36415 COLL VENOUS BLD VENIPUNCTURE: CPT | Performed by: FAMILY MEDICINE

## 2019-03-29 PROCEDURE — 82306 VITAMIN D 25 HYDROXY: CPT | Performed by: FAMILY MEDICINE

## 2019-03-29 RX ORDER — HYDROXYZINE HYDROCHLORIDE 25 MG/1
TABLET, FILM COATED ORAL
Refills: 4 | COMMUNITY
Start: 2019-03-24 | End: 2021-09-08

## 2019-03-29 RX ORDER — HYDROXYZINE PAMOATE 25 MG/1
25 CAPSULE ORAL
COMMUNITY
End: 2021-09-08

## 2019-03-29 RX ORDER — TRIHEXYPHENIDYL HYDROCHLORIDE 5 MG/1
TABLET ORAL
Refills: 4 | COMMUNITY
Start: 2019-03-24 | End: 2021-09-08 | Stop reason: ALTCHOICE

## 2019-03-29 NOTE — PROGRESS NOTES
Well-known to my practice ambulatory schizophrenic doing remarkably well living in a supervised housing area and nearby [place]. Sees a psychiatrist Dr. Edie Mitchell on a regular basis.   I took this time to review her numerous medications she is handling them v

## 2019-04-01 ENCOUNTER — TELEPHONE (OUTPATIENT)
Dept: FAMILY MEDICINE CLINIC | Facility: CLINIC | Age: 54
End: 2019-04-01

## 2019-04-01 NOTE — TELEPHONE ENCOUNTER
Pt would like a phone call back about her lab results. I did tell her that we are unable to giver her the results, until Dr Blessing Hfof has reviewed them.

## 2019-04-17 ENCOUNTER — TELEPHONE (OUTPATIENT)
Dept: FAMILY MEDICINE CLINIC | Facility: CLINIC | Age: 54
End: 2019-04-17

## 2019-04-23 ENCOUNTER — TELEPHONE (OUTPATIENT)
Dept: FAMILY MEDICINE CLINIC | Facility: CLINIC | Age: 54
End: 2019-04-23

## 2019-04-23 NOTE — TELEPHONE ENCOUNTER
MARIO                PT ADMITTED TO [address] [place] [address] Drive[address] ON April 21ST.

## 2019-04-23 NOTE — TELEPHONE ENCOUNTER
PT ADMITTED TO [address] [place] [address] Drive[address] ON April 21ST. NEEDS TO INFORM THE PROVIDER.

## 2019-05-03 ENCOUNTER — TELEPHONE (OUTPATIENT)
Dept: FAMILY MEDICINE CLINIC | Facility: CLINIC | Age: 54
End: 2019-05-03

## 2019-05-03 RX ORDER — LEVOTHYROXINE SODIUM 112 UG/1
112 TABLET ORAL
Qty: 90 TABLET | Refills: 0 | Status: SHIPPED | OUTPATIENT
Start: 2019-05-03 | End: 2019-06-17

## 2019-05-03 NOTE — TELEPHONE ENCOUNTER
Patient claims she was on Synthroid and the hospital changed her to 100. She needs a script correction for 125. Told patient a nurse would call her back to confirm dosage changes. She will have ready the number of where RX needs to go to.   She lives i

## 2019-05-03 NOTE — TELEPHONE ENCOUNTER
Consulted with Dr Migadlia Rojas who advised changing dosage to 112 mcg    Med ordered, pt notified.

## 2019-05-06 RX ORDER — POTASSIUM CHLORIDE 20 MEQ/1
TABLET, EXTENDED RELEASE ORAL
Qty: 14 TABLET | Refills: 5 | Status: SHIPPED | OUTPATIENT
Start: 2019-05-06 | End: 2019-05-29 | Stop reason: ALTCHOICE

## 2019-05-06 RX ORDER — ERGOCALCIFEROL 1.25 MG/1
CAPSULE ORAL
Qty: 2 CAPSULE | Refills: 5 | Status: SHIPPED | OUTPATIENT
Start: 2019-05-06 | End: 2021-09-08 | Stop reason: ALTCHOICE

## 2019-05-08 ENCOUNTER — TELEPHONE (OUTPATIENT)
Dept: FAMILY MEDICINE CLINIC | Facility: CLINIC | Age: 54
End: 2019-05-08

## 2019-05-08 NOTE — TELEPHONE ENCOUNTER
Pharmacy questioning if pt still takes omega 3  Potassium  Vitamin d  They were not on her discharge papers  She was discharged on 100 mcg of levothyroxine  On 05/03/19  Dr. Anne Ibanez gave rx for 112 mcg  Please advise

## 2019-05-08 NOTE — TELEPHONE ENCOUNTER
Advised pharmacist - pt still takes potassium, vitamin D and omega 3.  Correct dosage of levothyroxine is 112mcg

## 2019-05-10 ENCOUNTER — TELEPHONE (OUTPATIENT)
Dept: FAMILY MEDICINE CLINIC | Facility: CLINIC | Age: 54
End: 2019-05-10

## 2019-05-21 ENCOUNTER — TELEPHONE (OUTPATIENT)
Dept: FAMILY MEDICINE CLINIC | Facility: CLINIC | Age: 54
End: 2019-05-21

## 2019-05-29 ENCOUNTER — OFFICE VISIT (OUTPATIENT)
Dept: FAMILY MEDICINE CLINIC | Facility: CLINIC | Age: 54
End: 2019-05-29
Payer: MEDICARE

## 2019-05-29 VITALS
SYSTOLIC BLOOD PRESSURE: 120 MMHG | HEART RATE: 96 BPM | HEIGHT: 66.5 IN | OXYGEN SATURATION: 98 % | DIASTOLIC BLOOD PRESSURE: 80 MMHG | TEMPERATURE: 98 F | WEIGHT: 165.81 LBS | RESPIRATION RATE: 18 BRPM | BODY MASS INDEX: 26.33 KG/M2

## 2019-05-29 DIAGNOSIS — K60.0 ACUTE ANAL FISSURE: Primary | ICD-10-CM

## 2019-05-29 PROCEDURE — 99213 OFFICE O/P EST LOW 20 MIN: CPT | Performed by: FAMILY MEDICINE

## 2019-05-29 NOTE — PROGRESS NOTES
Presents with her  for pain and bleeding at the anal rectal edge. She has had otherwise normal colonoscopies and perhaps is coming due for this year. There is a sharp pain on actual defecation.     Exam abdomen is soft and nontender there is a

## 2019-05-30 ENCOUNTER — TELEPHONE (OUTPATIENT)
Dept: FAMILY MEDICINE CLINIC | Facility: CLINIC | Age: 54
End: 2019-05-30

## 2019-05-30 NOTE — TELEPHONE ENCOUNTER
[facility] is a Care Coordinator for Gustavo Matta [address]. She is looking to speak with Meaghan Mcdowell or his nurse.     [phone]

## 2019-05-31 NOTE — TELEPHONE ENCOUNTER
Allergy Clinic Note  [facility]    Subjective:      Chief Complaint: Follow-up    Allergy Problem List  Common variable immunodeficiency  Nonallergic rhinitis with normal CT sinus    History of Present Illness: Kianna Zuleta is a 44 y.o. female with common variable immunodeficiency (CV ID) who returns at my request to follow up symptoms and interval infections.    Related medications  Hizentra 10gram/50ml (20% soln):  200mg/kg every week.          Started June 2018  Singulair 10 mg - stopped  DuoNeb p.r.n. -- not needing  Albuterol p.r.n. - no needing  Azelastine -- uses rarely  Xyzal 5 mg - not needing  Hydroxyzine 25 mg t.i.d. P.r.n. - not needing  (beta-blocker)    Patient reports doing extremely well since starting Hizentra.  She reports no major infections since last visit.  She says she missed 3 days of work due to minor infections.  She has had no problems with the Hizentra infusions, except that she sometimes feels tired afterwards.    She denies any chest symptoms.  She says she has not needed albuterol.    Her rhinitis and symptoms have also improved.  She is no longer using Astelin on a regular basis.    Additional History:   Interval history is unremarkable.   Client is a lifetime nonsmoker.  Past medical, family, and social histories are unchanged.      Patient Active Problem List   Diagnosis    Vitamin D deficiency    Hypothyroidism    Irritable bowel syndrome    Elevated alkaline phosphatase level    Malignant carcinoid tumor of appendix    Fever    Elevated sed rate    CRP elevated    Hypertension    Morbid obesity    Abnormal CT of the chest    Eczema    Trigeminal neuralgia of right side of face    IgG deficiency    POTS (postural orthostatic tachycardia syndrome)     Current Outpatient Medications on File Prior to Visit   Medication Sig Dispense Refill    azelastine (ASTELIN) 137 mcg (0.1 %) nasal spray 2 SPRAYS each nostril Q 12 H PRN 30 mL 6    BD LUER-NANCY  Patient is looking to get weaned off the benadryl patient relates she has been cutting them in half and is feeling better and less drowsy. If okay to wean her off please let me know how you would want the weaning schedule     Please advise. "SYRINGE 3 mL 22 gauge x 1" Syrg INJECT QD FOR 5 DAYS THEN WEEKLY FOR 5 WEEKS THEN MONTHLY  3    desoximetasone (TOPICORT) 0.05 % cream Apply topically.      dicyclomine (BENTYL) 10 MG capsule Take by mouth.      ergocalciferol (ERGOCALCIFEROL) 50,000 unit Cap TAKE 1 CAPSULE BY MOUTH EVERY 7 DAYS 12 capsule 3    estradiol (ESTRACE) 2 MG tablet Take 1 tablet (2 mg total) by mouth once daily. 30 tablet 11    gabapentin (NEURONTIN) 300 MG capsule Take 300 mg by mouth 2 (two) times daily. 2-300 mg cap in AM and 3-300 mg cap in PM      hydrOXYzine HCl (ATARAX) 25 MG tablet Take 1 tablet (25 mg total) by mouth 3 (three) times daily. 45 tablet 1    immun glob G,IgG,-pro-IgA 0-50 (HIZENTRA) 10 gram/50 mL (20 %) Soln Inject 200 mg/kg into the skin every 7 days. 4 vial 5    levocetirizine (XYZAL) 5 MG tablet TK 1 T PO  ONCE Day  9    losartan (COZAAR) 25 MG tablet TK 1 T PO QD  0    metoprolol tartrate (LOPRESSOR) 50 MG tablet Take 50 mg by mouth 4 (four) times daily. 1 Tablet Oral Three times a day      montelukast (SINGULAIR) 10 mg tablet Take by mouth.      ondansetron (ZOFRAN-ODT) 4 MG TbDL Take 1 tablet (4 mg total) by mouth every 8 (eight) hours as needed (nausea). 20 tablet 0    oxyCODONE-acetaminophen (PERCOCET)  mg per tablet TK 1 T PO Q 6 H PRN  0    QUEtiapine (SEROQUEL) 50 MG tablet TK 1 T PO HS  0    syringe with needle, safety 3 mL 22 gauge x 1 1/2" Syrg B12 injection every day for 5 days then weekly for 5 weeks then monthly. 50 Syringe 3    tiZANidine 4 mg Cap TK 2 TO 3 CS PO TID WITH FOOD  1    vitamin D (VITAMIN D3) 1000 units Tab Take 1,000 Units by mouth once daily.      vortioxetine (BRINTELLIX) 10 mg Tab Take by mouth.      zaleplon (SONATA) 10 MG capsule Take 10 mg by mouth once daily.  2    albuterol (PROVENTIL) 2.5 mg /3 mL (0.083 %) nebulizer solution Take 3 mLs (2.5 mg total) by nebulization every 6 (six) hours as needed for Wheezing. Rescue 1 Box 0    ALBUTEROL INHL Inhale " "into the lungs as needed.       albuterol-ipratropium 2.5mg-0.5mg/3mL (DUO-NEB) 0.5 mg-3 mg(2.5 mg base)/3 mL nebulizer solution Take 3 mLs by nebulization every 6 (six) hours as needed for Wheezing. Rescue 1 Box 2    diclofenac sodium (PENNSAID) 2 % SoPk Apply topically.      EPINEPHrine (AUVI-Q) 0.3 mg/0.3 mL AtIn Inject 0.3 mLs (0.3 mg total) into the muscle once. 2 Device 0    [DISCONTINUED] azithromycin (ZITHROMAX Z-JARAD) 250 MG tablet 2 pills on day 1, then 1 pill a day 6 tablet 0    [DISCONTINUED] benzonatate (TESSALON PERLES) 100 MG capsule Take 2 capsules (200 mg total) by mouth 3 (three) times daily as needed. 30 capsule 0    [DISCONTINUED] cyanocobalamin, vitamin B-12, 1,000 mcg/mL Kit Inject 1,000 mcg as directed every 28 days. Take daily for 5 days then weekly for 5 weeks then monthly 12 kit 1    [DISCONTINUED] guaifenesin-codeine 100-10 mg/5 ml (CHERATUSSIN AC)  mg/5 mL syrup Take 10 mLs by mouth every 4 (four) hours as needed for Cough. 120 mL 0     No current facility-administered medications on file prior to visit.          Review of Systems   Constitutional: Negative for chills and fever.   HENT: Negative for ear discharge and nosebleeds.    Eyes: Negative for discharge and redness.   Respiratory: Negative for hemoptysis, sputum production, shortness of breath, wheezing and stridor.    Cardiovascular: Negative for chest pain and palpitations.   Gastrointestinal: Negative for blood in stool, melena and vomiting.   Genitourinary: Negative for dysuria and hematuria.   Skin: Negative for itching and rash.   Neurological: Negative for seizures and loss of consciousness.       Objective:   /86 (BP Location: Right arm, Patient Position: Sitting, BP Method: Large (Manual))   Ht 5' 4" (1.626 m)   Wt 123.1 kg (271 lb 6.2 oz)   BMI 46.58 kg/m²       Physical Exam   Constitutional: She is oriented to person, place, and time and well-developed, well-nourished, and in no distress.   HENT: "   Head: Normocephalic and atraumatic.   Nose: Nose normal.   Mouth/Throat: No oropharyngeal exudate.   Eyes: Conjunctivae are normal. No scleral icterus.   Neck: Neck supple.   Cardiovascular: Normal rate, regular rhythm, normal heart sounds and intact distal pulses.   Pulmonary/Chest: Effort normal and breath sounds normal. No stridor. No respiratory distress. She has no wheezes.   Abdominal: She exhibits no distension.   Musculoskeletal: She exhibits no edema or deformity.   Lymphadenopathy:     She has no cervical adenopathy.   Neurological: She is alert and oriented to person, place, and time.   Skin: No rash noted. No erythema.   Psychiatric: Affect and judgment normal.       Data:   IgG 600 (01/25/2018)  Poor response to pneumococcal vaccines x2    Immunoglobulins before replacement  Component      Latest Ref Rng & Units 1/22/2018 1/11/2018   IgG - Serum      650 - 1600 mg/dL 600 (L) 557 (L)   IgA      40 - 350 mg/dL 343 347   IgM      50 - 300 mg/dL 78 90       Pneumococcal titers  Component      Latest Ref Rng & Units 4/6/2018 3/1/2018   S.pneumoniae Type 1      mcg/mL 1.2 1.1   S.pneumoniae Type 3      mcg/mL 3.5 3.3   Strep pneumo Type 4      mcg/mL <0.3 <0.3   S.pneumoniae Type 5      mcg/mL <0.3 <0.3   S.pneumoniae Type 8      mcg/mL <0.3 <0.3   S.pneumoniae Type 9N      mcg/mL 1.0 0.8   S.pneumoniae Type 12F      mcg/mL 0.3 <0.3   Strep pneumo Type 14      mcg/mL 0.4 0.5   S.pneumoniae Type 19F      mcg/mL 0.6 0.6   S.pneumoniae Type 23F      mcg/mL <0.3 <0.3   S.pneumoniae Type 6B      mcg/mL 0.6 0.9   S.pneumoniae Type 7F      mcg/mL <0.3 0.3   S.pneumoniae Type 18C      mcg/mL <0.3 <0.3   S.pneumoniae Type 9V Abs      mcg/mL <0.3 <0.3   Status post pneumococcal polysaccharide vaccine 01/22/2018  Status post pneumococcal protein conjugated vaccine 03/16/2018    Other immune titers  Normal diphtheria and Hib titers (01/22/2018  Low rubella IgG (120 to/2018), now normal (04/06/2018)  Normal varicella  "IgG (01/22/2018    Lymphocyte subsets (04/06/2018)  notable for low B cells at 61 (normal 100-500).  She also had decrease number and proportion of NK cell at (41, normal ).  CD4 and CD8 T-cell numbers were normal    Other labs  CBC unremarkable (11/02/2018) with Eo count 100  IgG subsets (01/11/2018) low IgG 1, IgG 2, and IgG 3      Aeroallergen skin testing by the Immunocap method (122/2018) was entirely negative.  Total serum IgE < 35     Chest x-ray (PA and lateral, 10/24/2017):  Normal    CT sinus (01/22/2018): "Unremarkable noncontrast CT of the paranasal sinuses without significant paranasal sinus opacification or air-fluid level."         Assessment:     1. Common variable immunodeficiency    2. Need for prophylactic immunotherapy (immunoglobulin)    3. Nonallergic rhinitis        Plan:     Medical decision making:  Ms. Farnsworth common variable immune deficiency has responded very well to replacement immunoglobulin.  Plan to continue infusions for at least the next 6 months.  Will check interval IgG at pt convenience to see if dose can be decreased.         Recent immune laboratories showed that she has adequate protection against varicella, rubella, S pneumo, diphtheria, and tetanus.       Nonallergic rhinitis has also improved significantly, no longer requiring regular medications.    Kianna was seen today for follow-up.      Patient Instructions   Doing great!    Get blood drawn for IgG at your convenience  Return in 6 months.      Follow up in about 6 months (around 11/30/2019).    Kaylin Miller MD  "

## 2019-06-11 NOTE — TELEPHONE ENCOUNTER
Call received from Siddhartha  at [phone] [facility] [employer].   They will be taking over pt's medications  She need current meds [facility]illed to [facility] at # [phone] and a copy sent to her at fax # [phone]

## 2019-06-13 ENCOUNTER — TELEPHONE (OUTPATIENT)
Dept: FAMILY MEDICINE CLINIC | Facility: CLINIC | Age: 54
End: 2019-06-13

## 2019-06-13 NOTE — TELEPHONE ENCOUNTER
anusol hc    1  Bid for 10 days  1 refill, pended  Use for hemorrhoids       Okay to refill meds.  List is current to my knowledge,unless psychiatrist had changed doses

## 2019-06-13 NOTE — TELEPHONE ENCOUNTER
RX for Hydrocortisone Acetate (ANUSOL-HC) 25 MG Rectal Suppos  Is not covered by patients insurance. [facility] states insurance will cover Proctozone 2.5% cream and requested a new script to be sent over. Please advise.

## 2019-06-17 ENCOUNTER — TELEPHONE (OUTPATIENT)
Dept: FAMILY MEDICINE CLINIC | Facility: CLINIC | Age: 54
End: 2019-06-17

## 2019-06-17 RX ORDER — LEVOTHYROXINE SODIUM 112 UG/1
112 TABLET ORAL
Qty: 90 TABLET | Refills: 0 | Status: SHIPPED | OUTPATIENT
Start: 2019-06-17 | End: 2019-07-01

## 2019-06-17 RX ORDER — OMEGA-3-ACID ETHYL ESTERS 1 G/1
CAPSULE, LIQUID FILLED ORAL
Qty: 28 CAPSULE | Refills: 5 | Status: SHIPPED | OUTPATIENT
Start: 2019-06-17

## 2019-06-17 NOTE — TELEPHONE ENCOUNTER
Spoke to [place], she needs rx sent to [facility] not [facility] rx sent, and faxed to her at [phone]

## 2019-06-17 NOTE — TELEPHONE ENCOUNTER
Please call javier /  for pt back regarding  medical orders.  She states she spoke to someone last week and did not get a return call  Please call back

## 2019-06-26 ENCOUNTER — TELEPHONE (OUTPATIENT)
Dept: FAMILY MEDICINE CLINIC | Facility: CLINIC | Age: 54
End: 2019-06-26

## 2019-06-26 NOTE — TELEPHONE ENCOUNTER
Cortney Akers wanted to give notification of admission, pt was admitted to [facility].

## 2019-07-01 RX ORDER — LEVOTHYROXINE SODIUM 112 UG/1
TABLET ORAL
Qty: 14 TABLET | Refills: 5 | Status: SHIPPED | OUTPATIENT
Start: 2019-07-01 | End: 2019-08-13

## 2019-07-02 ENCOUNTER — TELEPHONE (OUTPATIENT)
Dept: FAMILY MEDICINE CLINIC | Facility: CLINIC | Age: 54
End: 2019-07-02

## 2019-07-29 ENCOUNTER — OFFICE VISIT (OUTPATIENT)
Dept: FAMILY MEDICINE CLINIC | Facility: CLINIC | Age: 54
End: 2019-07-29
Payer: MEDICARE

## 2019-07-29 VITALS
DIASTOLIC BLOOD PRESSURE: 80 MMHG | BODY MASS INDEX: 27 KG/M2 | WEIGHT: 168 LBS | RESPIRATION RATE: 20 BRPM | TEMPERATURE: 98 F | SYSTOLIC BLOOD PRESSURE: 118 MMHG | OXYGEN SATURATION: 98 % | HEART RATE: 78 BPM | HEIGHT: 66 IN

## 2019-07-29 DIAGNOSIS — M79.7 FIBROMYALGIA: ICD-10-CM

## 2019-07-29 DIAGNOSIS — I10 ESSENTIAL HYPERTENSION: ICD-10-CM

## 2019-07-29 DIAGNOSIS — Z12.31 ENCOUNTER FOR SCREENING MAMMOGRAM FOR MALIGNANT NEOPLASM OF BREAST: Primary | ICD-10-CM

## 2019-07-29 PROCEDURE — 99213 OFFICE O/P EST LOW 20 MIN: CPT | Performed by: FAMILY MEDICINE

## 2019-07-29 RX ORDER — HALOPERIDOL DECANOATE 100 MG/ML
INJECTION INTRAMUSCULAR
Refills: 2 | COMMUNITY
Start: 2019-06-07 | End: 2021-09-08 | Stop reason: ALTCHOICE

## 2019-07-29 RX ORDER — HALOPERIDOL 5 MG
TABLET ORAL
Refills: 0 | COMMUNITY
Start: 2019-07-15 | End: 2021-09-08 | Stop reason: ALTCHOICE

## 2019-07-29 RX ORDER — CARIPRAZINE 3 MG/1
CAPSULE, GELATIN COATED ORAL
Refills: 0 | COMMUNITY
Start: 2019-07-23 | End: 2021-09-08 | Stop reason: ALTCHOICE

## 2019-07-29 NOTE — PROGRESS NOTES
Patient presents with her designated psychiatric nurse to discuss a recurrence of her symmetric muscle pain.   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

## 2019-07-31 ENCOUNTER — TELEPHONE (OUTPATIENT)
Dept: FAMILY MEDICINE CLINIC | Facility: CLINIC | Age: 54
End: 2019-07-31

## 2019-07-31 NOTE — TELEPHONE ENCOUNTER
Faxing of the orders was stopped. Spoke with Jaiden Tyson and Sergio Downey. Until insurance is verified that it is accepted by this office, the orders will not be faxed. Also called Shanae Park and informed her to let us know when insurance is confirmed.     Sent oswaldo

## 2019-07-31 NOTE — TELEPHONE ENCOUNTER
[employer] Paperwork with [employer]. Paperwork was found on file desk.   I contacted Zoila Finley to find out where to fax the paperwork gave me the number and also stated that she was supposed to receive orders for

## 2019-08-07 ENCOUNTER — TELEPHONE (OUTPATIENT)
Dept: FAMILY MEDICINE CLINIC | Facility: CLINIC | Age: 54
End: 2019-08-07

## 2019-08-07 NOTE — TELEPHONE ENCOUNTER
Called to update JG that patient has been admitted to the hospital.  [facility]  On August 5th.

## 2019-08-14 RX ORDER — LEVOTHYROXINE SODIUM 112 UG/1
TABLET ORAL
Qty: 31 TABLET | Refills: 10 | Status: SHIPPED | OUTPATIENT
Start: 2019-08-14 | End: 2019-08-29

## 2019-08-16 ENCOUNTER — TELEPHONE (OUTPATIENT)
Dept: FAMILY MEDICINE CLINIC | Facility: CLINIC | Age: 54
End: 2019-08-16

## 2019-08-23 ENCOUNTER — PATIENT OUTREACH (OUTPATIENT)
Dept: CASE MANAGEMENT | Age: 54
End: 2019-08-23

## 2019-08-26 ENCOUNTER — PATIENT OUTREACH (OUTPATIENT)
Dept: CASE MANAGEMENT | Age: 54
End: 2019-08-26

## 2019-08-26 DIAGNOSIS — F25.9 SCHIZOAFFECTIVE DISORDER, CHRONIC CONDITION (HCC): ICD-10-CM

## 2019-08-26 DIAGNOSIS — F33.1 MAJOR DEPRESSIVE DISORDER, RECURRENT EPISODE, MODERATE (HCC): ICD-10-CM

## 2019-08-26 DIAGNOSIS — I10 ACCELERATED HYPERTENSION: ICD-10-CM

## 2019-08-26 NOTE — PROGRESS NOTES
8/26/2019  Spoke to Alanna for CCM. Updates to patient care team/ comments: None   Patient reported changes in medications: None  Med Adherence  Comment: Pt taking medication as prescribed.      Health Maintenance:     Colonoscopy due on 01/27/2013 -

## 2019-08-28 ENCOUNTER — TELEPHONE (OUTPATIENT)
Dept: FAMILY MEDICINE CLINIC | Facility: CLINIC | Age: 54
End: 2019-08-28

## 2019-08-28 NOTE — TELEPHONE ENCOUNTER
TSH better but still not at goal.  Reduce levothyroxine from 112 mcg to 100 mcg once daily. Please send Rx to pharmacy and fax over as requested. Recheck TSH in 8 weeks.

## 2019-08-28 NOTE — TELEPHONE ENCOUNTER
TSH 0.125 done on 8/23. She is faxing over the results but says we need to adjust the pt's medication and send it to her pharmacy.

## 2019-08-29 RX ORDER — LEVOTHYROXINE SODIUM 0.1 MG/1
100 TABLET ORAL
Qty: 31 TABLET | Refills: 1 | Status: SHIPPED | OUTPATIENT
Start: 2019-08-29 | End: 2019-10-09

## 2019-08-29 NOTE — TELEPHONE ENCOUNTER
Spoke to sister Jaqueline Amezquita about lab result. rx sent to pharmacy - repeat TSH in 8 weeks -sister aware - copy of labs sent to sister.

## 2019-08-31 PROCEDURE — 99490 CHRNC CARE MGMT STAFF 1ST 20: CPT

## 2019-10-10 RX ORDER — LEVOTHYROXINE SODIUM 0.1 MG/1
TABLET ORAL
Qty: 31 TABLET | Refills: 10 | Status: SHIPPED | OUTPATIENT
Start: 2019-10-10 | End: 2021-07-27

## 2019-10-28 ENCOUNTER — TELEPHONE (OUTPATIENT)
Dept: FAMILY MEDICINE CLINIC | Facility: CLINIC | Age: 54
End: 2019-10-28

## 2021-03-19 ENCOUNTER — APPOINTMENT (OUTPATIENT)
Dept: FAMILY MEDICINE | Age: 56
End: 2021-03-19

## 2021-05-06 ENCOUNTER — TELEPHONE (OUTPATIENT)
Dept: FAMILY MEDICINE | Age: 56
End: 2021-05-06

## 2021-07-27 PROBLEM — F22 PARANOID IDEATION (HCC): Status: ACTIVE | Noted: 2017-09-28

## 2021-07-27 PROBLEM — F25.1 SCHIZOAFFECTIVE DISORDER, DEPRESSIVE TYPE (HCC): Status: ACTIVE | Noted: 2017-06-29

## 2021-07-27 PROBLEM — T75.00XA: Status: ACTIVE | Noted: 2021-07-27

## 2021-09-07 PROBLEM — R87.810 PAPANICOLAOU SMEAR OF CERVIX WITH POSITIVE HIGH RISK HUMAN PAPILLOMA VIRUS (HPV) TEST: Status: ACTIVE | Noted: 2018-09-13

## 2021-09-24 RX ORDER — OLANZAPINE 15 MG/1
15 TABLET ORAL NIGHTLY
COMMUNITY

## 2021-09-28 RX ORDER — POLYETHYLENE GLYCOL 3350 17 G/17G
17 POWDER, FOR SOLUTION ORAL DAILY PRN
COMMUNITY

## 2021-09-29 ENCOUNTER — ANESTHESIA (OUTPATIENT)
Dept: ENDOSCOPY | Facility: HOSPITAL | Age: 56
End: 2021-09-29
Payer: MEDICARE

## 2021-09-29 ENCOUNTER — HOSPITAL ENCOUNTER (OUTPATIENT)
Facility: HOSPITAL | Age: 56
Setting detail: HOSPITAL OUTPATIENT SURGERY
Discharge: HOME | End: 2021-09-29
Attending: INTERNAL MEDICINE | Admitting: INTERNAL MEDICINE
Payer: MEDICARE

## 2021-09-29 ENCOUNTER — ANESTHESIA EVENT (OUTPATIENT)
Dept: ENDOSCOPY | Facility: HOSPITAL | Age: 56
End: 2021-09-29
Payer: MEDICARE

## 2021-09-29 VITALS
SYSTOLIC BLOOD PRESSURE: 117 MMHG | RESPIRATION RATE: 18 BRPM | HEART RATE: 85 BPM | HEIGHT: 66.5 IN | BODY MASS INDEX: 34.94 KG/M2 | OXYGEN SATURATION: 100 % | WEIGHT: 220 LBS | TEMPERATURE: 98 F | DIASTOLIC BLOOD PRESSURE: 66 MMHG

## 2021-09-29 DIAGNOSIS — Z01.818 PRE-OP TESTING: Primary | ICD-10-CM

## 2021-09-29 DIAGNOSIS — Z12.11 SPECIAL SCREENING FOR MALIGNANT NEOPLASMS, COLON: ICD-10-CM

## 2021-09-29 DIAGNOSIS — Z80.0 FAMILY HISTORY OF MALIGNANT NEOPLASM OF GASTROINTESTINAL TRACT: ICD-10-CM

## 2021-09-29 LAB — SARS-COV-2 RNA RESP QL NAA+PROBE: NOT DETECTED

## 2021-09-29 PROCEDURE — 0DJD8ZZ INSPECTION OF LOWER INTESTINAL TRACT, VIA NATURAL OR ARTIFICIAL OPENING ENDOSCOPIC: ICD-10-PCS | Performed by: INTERNAL MEDICINE

## 2021-09-29 RX ORDER — MIDAZOLAM HYDROCHLORIDE 1 MG/ML
INJECTION INTRAMUSCULAR; INTRAVENOUS AS NEEDED
Status: DISCONTINUED | OUTPATIENT
Start: 2021-09-29 | End: 2021-09-29 | Stop reason: SURG

## 2021-09-29 RX ORDER — ONDANSETRON 2 MG/ML
4 INJECTION INTRAMUSCULAR; INTRAVENOUS AS NEEDED
Status: DISCONTINUED | OUTPATIENT
Start: 2021-09-29 | End: 2021-09-29 | Stop reason: HOSPADM

## 2021-09-29 RX ORDER — SODIUM CHLORIDE, SODIUM LACTATE, POTASSIUM CHLORIDE, CALCIUM CHLORIDE 600; 310; 30; 20 MG/100ML; MG/100ML; MG/100ML; MG/100ML
INJECTION, SOLUTION INTRAVENOUS CONTINUOUS
Status: DISCONTINUED | OUTPATIENT
Start: 2021-09-29 | End: 2021-09-29

## 2021-09-29 RX ORDER — NALOXONE HYDROCHLORIDE 0.4 MG/ML
80 INJECTION, SOLUTION INTRAMUSCULAR; INTRAVENOUS; SUBCUTANEOUS AS NEEDED
Status: DISCONTINUED | OUTPATIENT
Start: 2021-09-29 | End: 2021-09-29 | Stop reason: HOSPADM

## 2021-09-29 RX ORDER — METOCLOPRAMIDE HYDROCHLORIDE 5 MG/ML
10 INJECTION INTRAMUSCULAR; INTRAVENOUS AS NEEDED
Status: DISCONTINUED | OUTPATIENT
Start: 2021-09-29 | End: 2021-09-29 | Stop reason: HOSPADM

## 2021-09-29 RX ORDER — LIDOCAINE HYDROCHLORIDE 10 MG/ML
INJECTION, SOLUTION EPIDURAL; INFILTRATION; INTRACAUDAL; PERINEURAL AS NEEDED
Status: DISCONTINUED | OUTPATIENT
Start: 2021-09-29 | End: 2021-09-29 | Stop reason: SURG

## 2021-09-29 RX ADMIN — LIDOCAINE HYDROCHLORIDE 50 MG: 10 INJECTION, SOLUTION EPIDURAL; INFILTRATION; INTRACAUDAL; PERINEURAL at 14:08:00

## 2021-09-29 RX ADMIN — SODIUM CHLORIDE, SODIUM LACTATE, POTASSIUM CHLORIDE, CALCIUM CHLORIDE: 600; 310; 30; 20 INJECTION, SOLUTION INTRAVENOUS at 14:03:00

## 2021-09-29 RX ADMIN — MIDAZOLAM HYDROCHLORIDE 2 MG: 1 INJECTION INTRAMUSCULAR; INTRAVENOUS at 14:08:00

## 2021-09-29 NOTE — BRIEF OP NOTE
Pre-Operative Diagnosis: Family history of malignant neoplasm of gastrointestinal tract [Z80.0]  Special screening for malignant neoplasms, colon [Z12.11]     Post-Operative Diagnosis: Normal Colon      Procedure Performed:   COLONOSCOPY    Surgeon(s) and

## 2021-09-29 NOTE — ANESTHESIA POSTPROCEDURE EVALUATION
[address] Patient Status:  Hospital Outpatient Surgery   Age/Gender 60year old female MRN [id number]   Location [id number] [place] [address] [address] Attending Yvan Pang MD   Hosp Day # 0 PCP DO Yanet Pickard

## 2021-09-29 NOTE — OPERATIVE REPORT
ENDOSCOPY OPERATIVE REPORT    Patient Name:  Vamsi Florez  Medical Record #: [id number]  YOB: 1965  Date of Procedure: 9/29/2021    Preoperative Diagnosis:  screening    Postoperative Diagnosis: unremarkable exam    Procedure Performed care.    Repeat colonoscopy with MAC in 10 years        Plan is to discharge home when Anesthesia post-surgical assessment determines patient is stable and has met discharge criteria.      The patient was informed of the endoscopic findings and was also giv

## 2021-09-29 NOTE — ANESTHESIA PREPROCEDURE EVALUATION
PRE-OP EVALUATION    Patient Name: Erica Telles    Admit Diagnosis: Family history of malignant neoplasm of gastrointestinal tract [Z80.0]  Special screening for malignant neoplasms, colon [Z12.11]    Pre-op Diagnosis: Family history of malignant neop Rfl: 2, 9/29/2021 at Unknown time  TraZODone HCl 100 MG Oral Tab, Take 100 mg by mouth nightly., Disp: , Rfl: 4, 9/29/2021 at Unknown time        Allergies: Sulfa Antibiotics, Codeine, Cogentin [Benztropine], Donepezil, Hydrocodone, Sulfa Drugs Cross React history. Pulmonary    Pulmonary exam normal.  Breath sounds clear to auscultation bilaterally. (-) rales     Other findings            ASA: 2   Plan: MAC  NPO status verified and patient meets guidelines.   Patient has not taken beta blocke

## 2021-09-29 NOTE — H&P
Xavier Jc presents for endoscopy.       Denies gi bleeding, abdominal pain, altered bowel habits, or fhx of crc except for grandparent  States prep is clear  States doing well       The risks and benefits of the procedure were discussed in detail wi Disp: , Rfl: 2  TraZODone HCl 100 MG Oral Tab, Take 100 mg by mouth nightly., Disp: , Rfl: 4          Sulfa Antibiotics       OTHER (SEE COMMENTS)  Codeine                 UNKNOWN    Comment:Derivatives  Cogentin [Benztropi*    UNKNOWN  Donepezil

## 2022-03-27 ENCOUNTER — WALK IN (OUTPATIENT)
Dept: URGENT CARE | Age: 57
End: 2022-03-27

## 2022-03-27 VITALS
HEART RATE: 88 BPM | SYSTOLIC BLOOD PRESSURE: 100 MMHG | RESPIRATION RATE: 16 BRPM | DIASTOLIC BLOOD PRESSURE: 80 MMHG | TEMPERATURE: 97.4 F

## 2022-03-27 DIAGNOSIS — J02.9 SORE THROAT: Primary | ICD-10-CM

## 2022-03-27 DIAGNOSIS — J06.9 ACUTE URI: ICD-10-CM

## 2022-03-27 LAB
INTERNAL PROCEDURAL CONTROLS ACCEPTABLE: YES
S PYO AG THROAT QL IA.RAPID: NEGATIVE

## 2022-03-27 PROCEDURE — 87880 STREP A ASSAY W/OPTIC: CPT | Performed by: NURSE PRACTITIONER

## 2022-03-27 PROCEDURE — 99202 OFFICE O/P NEW SF 15 MIN: CPT | Performed by: NURSE PRACTITIONER

## 2022-03-27 RX ORDER — LORAZEPAM 0.5 MG/1
TABLET ORAL
COMMUNITY
Start: 2022-01-19

## 2022-03-27 RX ORDER — DULOXETIN HYDROCHLORIDE 30 MG/1
CAPSULE, DELAYED RELEASE ORAL
COMMUNITY
Start: 2022-03-14

## 2022-03-27 RX ORDER — TRAZODONE HYDROCHLORIDE 100 MG/1
TABLET ORAL
COMMUNITY
Start: 2022-03-14

## 2022-03-27 RX ORDER — LEVOTHYROXINE SODIUM 112 UG/1
112 TABLET ORAL
COMMUNITY
Start: 2021-07-30 | End: 2022-07-25

## 2022-03-27 RX ORDER — HYDROXYZINE 50 MG/1
TABLET, FILM COATED ORAL
COMMUNITY
Start: 2022-03-14

## 2022-03-27 ASSESSMENT — ENCOUNTER SYMPTOMS
SINUS PRESSURE: 1
CHILLS: 0
WHEEZING: 0
HEADACHES: 0
FEVER: 0
RHINORRHEA: 1
FATIGUE: 1
GASTROINTESTINAL NEGATIVE: 1
DIAPHORESIS: 0
SHORTNESS OF BREATH: 0
SORE THROAT: 1
COUGH: 1

## 2022-08-11 NOTE — PATIENT INSTRUCTIONS
Daily Progress Note - Critical Care   Joe Roger 66 y o  male MRN: [id number]  Unit/Bed#: [facility] 515-01 Encounter: [id number]        ----------------------------------------------------------------------------------------  HPI/24hr events: 65 y/o male with PMHx pancreatic cancer, MORALES on CPAP, A-fib, and DM who now presents with intra-abd sepsis, TERESE requiring RRT, and acute hypoxemic respiratory failure s/p completion pancreatectomy     7/26 completion pancreatectomy, sims anastamosis, SARAH, reduction of internal hernia, and cholecystectomy  8/3 IR drain placement     De-saturation event, appeared uncomfortable on vent, sedation increased with improvement      ---------------------------------------------------------------------------------------  SUBJECTIVE    Review of Systems   Unable to perform ROS: Intubated     Review of systems was reviewed and negative unless stated above in HPI/24-hour events   ---------------------------------------------------------------------------------------  Assessment and Plan:    Neuro:    Analgesia:ATC tylenol   o fent drip  o PRN dilaudid    Sedation: Precedex, fent 100   Delirium PPX: CAM-ICU, Sleep Hygiene    Toxic/Metabolic Encephalopathy: consider starting antipsychotic regimen       CV:    Hypotension, likely ongoing septic shock: requiring levophed 4 vaso 0 04  o Seems to be sedation related     Atrial Fibrillation: hold amio 2/2 bradycardia   o Currently in sinus rhythm       Pulm:   Acute Hypoxemic Respiratory Failure: requiring intubation and ventilation day 7  o Continue PS 7/12 50% FiO2  o Thick secretions consider bronchoscopy today       GI:    IPMN s/p completion pancreatectomy: now complicated by intra-abd sepsis   o Continue abc as below  o Maintain drains   o appreicate surg onc   Gi PPX: protonix    Bowel Reg: senna/colace, miralax, dulcolax  o Last BM 7/31  o relistor today      :    TERESE requiring CRRT  o Continue to run net negative LAB HOURS & LOCATIONS        Neena  [facility])    [address] [place] [address] S.  [address] [place] [address]     ([address])         [address] [place] [address] Ave    Mon-Fri  5am-8pm     Mon-Fri   7am-4pm  Sat         6am-3pm     Sat          7am-3pm      Clint Arroyo 150cc/hr  o Appreciate nephro assistance   Continue hanna      F/E/N:    Electrolytes - Monitor/trend - replete based on deficiencies    Continue TPN, not tolerating tube feeds      Heme/Onc:    Thrombocytopenia/anemia: likely 2/2 sepsis and CVVH  o Transfuse for Hgb <7 or plt <20   DVT PPX: hold chemo PPX 2/2 above, SCD alone       Endo:    DM: continue insulin drip       ID:    Intra-abd sepsis   o Appreciate ID  o Continue micfungin and Zosyn      MSK/Skin:    Turn frequently and reposition    PT/OT when approriate       Disposition: Continue Critical Care   Code Status: Level 1 - Full Code  ---------------------------------------------------------------------------------------  ICU CORE MEASURES    Prophylaxis   VTE Pharmacologic Prophylaxis: Pharmacologic VTE Prophylaxis contraindicated due to thrombocytopenia  VTE Mechanical Prophylaxis: sequential compression device  Stress Ulcer Prophylaxis: Pantoprazole IV     ABCDE Protocol (if indicated)  Plan to perform spontaneous awakening trial today? Yes  Plan to perform spontaneous breathing trial today? Yes  Obvious barriers to extubation? Yes  CAM-ICU: Positive    Invasive Devices Review  Invasive Devices  Report    Peripherally Inserted Central Catheter Line  Duration           PICC Line 08/03/22 7 days          Central Venous Catheter Line  Duration           Port A Cath 03/30/22 Right Subclavian 133 days          Peripheral Intravenous Line  Duration           Peripheral IV 08/09/22 Dorsal (posterior); Right Forearm 2 days          Arterial Line  Duration           Arterial Line 08/06/22 Radial 5 days          Hemodialysis Catheter  Duration           HD Temporary Double Catheter 5 days          Drain  Duration           Closed/Suction Drain Right RLQ Bulb 19 Fr  15 days    Urethral Catheter Temperature probe 16 Fr  9 days    NG/OG/Enteral Tube Enteral Feeding Tube Right nare 8 days    Abscess Drain LUQ 7 days          Airway  Duration           ETT Oral;Cuffed; Hi-Lo 8 mm 5 days              Can any invasive devices be discontinued today? No (provide explanation): required for treatment therapy   ---------------------------------------------------------------------------------------  OBJECTIVE    Physical Exam  Constitutional:       Appearance: He is obese  HENT:      Head: Normocephalic  Mouth/Throat:      Mouth: Mucous membranes are moist    Eyes:      Pupils: Pupils are equal, round, and reactive to light  Cardiovascular:      Rate and Rhythm: Normal rate  Rhythm irregular  Pulmonary:      Breath sounds: Rhonchi present  Abdominal:      General: There is distension  Tenderness: There is no abdominal tenderness  Comments: 2 drains in place   Musculoskeletal:      Cervical back: Normal range of motion  Right lower leg: Edema present  Left lower leg: Edema present  Skin:     General: Skin is warm  Capillary Refill: Capillary refill takes less than 2 seconds  Neurological:      General: No focal deficit present  Mental Status: He is alert  Comments: GCS 11T         Vitals   Vitals:    22 0430 22 0500 22 0551 22 0600   BP: 113/61 109/64  126/67   BP Location:       Pulse: 69 67  70   Resp: (!) 9 (!) 10     Temp: 97 7 °F (36 5 °C) 97 7 °F (36 5 °C)  97 7 °F (36 5 °C)   TempSrc:       SpO2: 99% 99%  96%   Weight:   (!) 136 kg (300 lb 11 3 oz)    Height:         Temp (24hrs), Av 7 °F (36 5 °C), Min:97 5 °F (36 4 °C), Max:98 78 °F (37 1 °C)  Current: Temperature: 97 7 °F (36 5 °C)      Invasive/non-invasive ventilation settings   Respiratory  Report   Lab Data (Last 4 hours)    None         O2/Vent Data (Last 4 hours)    None                Height and Weights   Height: 6' (182 9 cm)  IBW (Ideal Body Weight): 77 6 kg  Body mass index is 40 78 kg/m²    Weight (last 2 days)     Date/Time Weight    22 0551 136 (300 71)    08/10/22 0600 143 (315 48)    22 0547 142 (312 83) Intake and Output  I/O       08/09 0701  08/10 0700 08/10 0701 08/11 0700    P  O  0 0    I V  (mL/kg) 2168 2 (15 2) 1239 (9 1)    NG/GT 90 380    IV Piggyback 730 2159    TPN 1707 3 1478    Feedings 42     Total Intake(mL/kg) 4737 5 (33 1) 5256 (38 6)    Urine (mL/kg/hr) 1190 (0 3) 1052 (0 3)    Emesis/NG output 850 700    Drains 571 595    Other 5446 4799    Stool 0 0    Total Output 8057 7146    Net -3319 5 -1890          Unmeasured Stool Occurrence 0 x 0 x            Nutrition       Diet Orders   (From admission, onward)             Start     Ordered    08/04/22 0925  Diet Enteral/Parenteral; Tube Feeding with Oral Diet; Vital AF 1 2; Continuous; 10; Surgical; NPO  Diet effective now        References:    Nutrtion Support Algorithm Enteral vs  Parenteral   Question Answer Comment   Diet Type Enteral/Parenteral    Enteral/Parenteral Tube Feeding with Oral Diet    Tube Feeding Formula: Vital AF 1 2    Bolus/Cyclic/Continuous Continuous    Tube Feeding Goal Rate (mL/hr): 10    Diet Type Surgical    Surgical NPO    RD to adjust diet per protocol?  No        08/04/22 0926                  Laboratory and Diagnostics:  Results from last 7 days   Lab Units 08/11/22  0549 08/10/22  0600 08/09/22  0418 08/08/22  1548 08/08/22  1423 08/08/22  0548 08/07/22  0922 08/07/22  0802 08/07/22  0618 08/06/22  0600 08/06/22  0129   WBC Thousand/uL 13 64* 15 01* 18 40* 24 39*  --  27 57* 53 13* 53 82* 48 85*   < > 4 62   HEMOGLOBIN g/dL 7 6* 7 7* 6 6* 7 0*  --  7 2* 8 3* 8 2* 8 1*   < > 8 2*   I STAT HEMOGLOBIN   --   --   --   --   --   --   --   --   --    < >  --    HEMATOCRIT % 24 3* 24 1* 21 6* 21 9*  --  22 6* 25 6* 25 5* 25 7*   < > 26 6*   HEMATOCRIT, ISTAT   --   --   --   --   --   --   --   --   --    < >  --    PLATELETS Thousands/uL 21* 20* 27* 39* 35* 14* 20* 22* 21*   < >  --    NEUTROS PCT %  --   --  89*  --   --   --  91*  --   --   --   --    BANDS PCT %  --   --   --   --   --  10*  --   --  17*  --   -- MONOS PCT %  --   --  4  --   --   --  1*  --   --   --   --    MONO PCT %  --   --   --   --   --  3*  --   --  0*  --  1*    < > = values in this interval not displayed       Results from last 7 days   Lab Units 08/11/22  0549 08/10/22  2347 08/10/22  1800 08/10/22  1215 08/10/22  0600 08/09/22  2355 08/09/22  1814 08/09/22  0553 08/09/22  0418 08/08/22  1156 08/08/22  0548 08/07/22  1202 08/07/22  0922 08/06/22  1220 08/06/22  0600 08/05/22  1155 08/05/22  0436   SODIUM mmol/L 137 136 137 136 137 136 136   < >  --    < > 139   < >  --    < > 141   < > 138   POTASSIUM mmol/L 4 0 4 2 3 9 4 2 4 0 3 8 4 0   < >  --    < > 4 7   < >  --    < > 3 9   < > 3 8   CHLORIDE mmol/L 108 107 108 106 107 106 108   < >  --    < > 110*   < >  --    < > 111*   < > 103   CO2 mmol/L 26 26 25 25 24 27 27   < >  --    < > 26   < >  --    < > 22   < > 22   CO2, I-STAT   --   --   --   --   --   --   --   --   --   --   --   --   --    < >  --   --   --    ANION GAP mmol/L 3* 3* 4 5 6 3* 1*   < >  --    < > 3*   < >  --    < > 8   < > 13   BUN mg/dL 14 14 13 15 16 13 14   < >  --    < > 18   < >  --    < > 34*   < > 41*   CREATININE mg/dL 0 66 0 66 0 73 0 78 0 80 0 64 0 81   < >  --    < > 0 90   < >  --    < > 2 60*   < > 2 80*   CALCIUM mg/dL 7 9* 8 5 7 8* 8 5 8 4 8 4 8 2*   < >  --    < > 7 8*   < >  --    < > 7 9*   < > 8 0*   GLUCOSE RANDOM mg/dL 163* 176* 157* 139 133 92 125   < >  --    < > 129   < >  --    < > 104   < > 410*   ALT U/L 28  --   --   --   --   --   --   --  29  --  26  --  26  --  25  --  25   AST U/L 59*  --   --   --   --   --   --   --  57*  --  56*  --  64*  --  43  --  32   ALK PHOS U/L 161*  --   --   --   --   --   --   --  221*  --  155*  --  142*  --  191*  --  116   ALBUMIN g/dL 1 5*  --   --   --   --   --   --   --  1 6*  --  1 6*  --  1 8*  --  2 0*  --  2 3*   TOTAL BILIRUBIN mg/dL 5 25*  --   --   --   --   --   --   --  3 87*  --  3 98*  --  4 73*  --  4 03*  --  3 74*    < > = values in this interval not displayed       Results from last 7 days   Lab Units 08/11/22  0549 08/10/22  2347 08/10/22  1800 08/10/22  1215 08/10/22  0600 08/09/22  2355 08/09/22  1814   MAGNESIUM mg/dL 1 8 1 9 1 9 1 9 2 0 1 8 1 9   PHOSPHORUS mg/dL 2 1* 2 0* 2 1* 1 8* 1 7* 2 0* 2 2*      Results from last 7 days   Lab Units 08/08/22  1423   INR  1 30*   PTT seconds 37          Results from last 7 days   Lab Units 08/07/22  0922 08/07/22  0617 08/06/22  2041 08/06/22  1659 08/06/22  1622 08/06/22  1333 08/06/22  0600   LACTIC ACID mmol/L 2 4* 2 5* 3 3* 4 1* 3 6* 4 1* 5 7*     ABG:  Results from last 7 days   Lab Units 08/10/22  0554 08/09/22  2355   PH ART  7 469* 7 417   PCO2 ART mm Hg 32 0* 41 5   PO2 ART mm Hg 101 2 82 7   HCO3 ART mmol/L 22 7 26 1   BASE EXC ART mmol/L -0 7 1 5   ABG SOURCE   --  Line, Arterial     VBG:  Results from last 7 days   Lab Units 08/09/22  2355   ABG SOURCE  Line, Arterial           Micro            Active Medications  Scheduled Meds:  Current Facility-Administered Medications   Medication Dose Route Frequency Provider Last Rate    acetaminophen  975 mg Per NG Tube Q8H Corrine Imperatore, DO      acetylcysteine  3 mL Nebulization Q8H Trung Armendariz PA-C      Adult TPN (CUSTOM BASE/CUSTOM ELECTROLYTE)   Intravenous Continuous TPN Cyndi Alba, CRNP 71 5 mL/hr at 08/10/22 2115    amiodarone  0 5 mg/min Intravenous Continuous Cyndi Alba, CRNP Stopped (08/09/22 2215)    bisacodyl  10 mg Rectal Daily Trung Armendariz PA-C      calcium gluconate  1 g Intravenous Once Yasmin Davis MD 1 g (08/11/22 0634)    chlorhexidine  15 mL Mouth/Throat Q12H [address]      dexmedetomidine  0 1-1 2 mcg/kg/hr Intravenous Titrated Trung Armendariz PA-C 1 2 mcg/kg/hr (08/11/22 0610)    fentaNYL  100 mcg/hr Intravenous Continuous Trung Armendariz PA-C 100 mcg/hr (08/11/22 0030)    HYDROmorphone  0 5 mg Intravenous Q3H PRN Trung Armendariz PA-C      insulin regular (HumuLIN R,NovoLIN R) infusion  0 3-21 Units/hr Intravenous Titrated Yasmine Eid PA-C 3 Units/hr (08/11/22 0415)    iohexol  30 mL Oral 90 min pre-procedure 100 [facility], CRNP      ipratropium  0 5 mg Nebulization Q6H Christ Fu MD      levalbuterol  1 25 mg Nebulization Q6H Christ Fu MD      magnesium sulfate  1 g Intravenous Once Aleida Hunter MD      methocarbamol  500 mg Oral Q6H  [facility], CRNP      micafungin  100 mg Intravenous Q24H Omid Frey  mg (08/10/22 1733)    norepinephrine  1-30 mcg/min Intravenous Titrated Osvaldo Dow PA-C 3 mcg/min (08/11/22 2240)   Wash Caller NxStage K 4/Ca 3  20,000 mL Dialysis Continuous Ky Michele Collier DO 0 mL (08/08/22 0715)    ondansetron  4 mg Intravenous Q6H PRN Yasmine Eid PA-C      pantoprazole  40 mg Intravenous Q12H [facility]  ArinaMIRNA      phenol  1 spray Mouth/Throat Q2H PRN Medardo Winchester PA-C      piperacillin-tazobactam  3 375 g Intravenous Q8H Omid Frey MD 3 375 g (08/11/22 0405)    polyethylene glycol  17 g Oral Daily [facility]      senna-docusate sodium  1 tablet Oral  [facility]      sodium chloride  4 mL Nebulization TID Yasmine Eid PA-C      sodium phosphate  6 mmol Intravenous Once Aleida Hunter MD      vasopressin (PITRESSIN) in 0 9 % sodium chloride 100 mL  0 04 Units/min Intravenous Continuous Walter Hernandez MD 0 04 Units/min (08/11/22 0034)     Continuous Infusions:  Adult TPN (CUSTOM BASE/CUSTOM ELECTROLYTE), , Last Rate: 71 5 mL/hr at 08/10/22 2115  amiodarone, 0 5 mg/min, Last Rate: Stopped (08/09/22 2215)  dexmedetomidine, 0 1-1 2 mcg/kg/hr, Last Rate: 1 2 mcg/kg/hr (08/11/22 0610)  fentaNYL, 100 mcg/hr, Last Rate: 100 mcg/hr (08/11/22 0030)  insulin regular (HumuLIN R,NovoLIN R) infusion, 0 3-21 Units/hr, Last Rate: 3 Units/hr (08/11/22 0415)  norepinephrine, 1-30 mcg/min, Last Rate: 3 mcg/min (08/11/22 3349)  NxStage K 4/Ca 3, 20,000 mL, Last Rate: 0 mL (08/08/22 0715)  vasopressin (PITRESSIN) in 0 9 % sodium chloride 100 mL, 0 04 Units/min, Last Rate: 0 04 Units/min (08/11/22 0034)      PRN Meds:   HYDROmorphone, 0 5 mg, Q3H PRN  methocarbamol, 500 mg, Q6H PRN  ondansetron, 4 mg, Q6H PRN  phenol, 1 spray, Q2H PRN        Allergies   No Known Allergies    Advance Directive and Living Will:      Power of :    POLST:        Counseling / Coordination of Care  Total Critical Care time spent 30 minutes excluding procedures, teaching and family updates  Alphonzo Bumpers, PA-C        Portions of the record may have been created with voice recognition software  Occasional wrong word or "sound a like" substitutions may have occurred due to the inherent limitations of voice recognition software    Read the chart carefully and recognize, using context, where substitutions have occurred

## 2023-06-28 NOTE — TELEPHONE ENCOUNTER
Per [facility] , pt last seen in 2004, needs referral to set up appt  Spoke with Dena Conner [phone]  - pt is having vision problems.   Wanted referral to [facility], referral order placed
Opt out

## 2024-02-18 ENCOUNTER — APPOINTMENT (OUTPATIENT)
Dept: URGENT CARE | Age: 59
End: 2024-02-18

## 2024-02-19 ENCOUNTER — APPOINTMENT (OUTPATIENT)
Dept: URGENT CARE | Age: 59
End: 2024-02-19

## 2024-02-21 ENCOUNTER — V-VISIT (OUTPATIENT)
Dept: URGENT CARE | Age: 59
End: 2024-02-21

## 2024-02-21 VITALS
SYSTOLIC BLOOD PRESSURE: 121 MMHG | OXYGEN SATURATION: 97 % | BODY MASS INDEX: 34.53 KG/M2 | DIASTOLIC BLOOD PRESSURE: 84 MMHG | TEMPERATURE: 98.9 F | HEART RATE: 117 BPM | RESPIRATION RATE: 16 BRPM | WEIGHT: 220 LBS | HEIGHT: 67 IN

## 2024-02-21 DIAGNOSIS — B96.89 ACUTE BACTERIAL RHINOSINUSITIS: ICD-10-CM

## 2024-02-21 DIAGNOSIS — J01.90 ACUTE BACTERIAL RHINOSINUSITIS: ICD-10-CM

## 2024-02-21 DIAGNOSIS — J06.9 ACUTE UPPER RESPIRATORY INFECTION: Primary | ICD-10-CM

## 2024-02-21 LAB
FLUAV AG UPPER RESP QL IA.RAPID: NEGATIVE
FLUBV AG UPPER RESP QL IA.RAPID: NEGATIVE
INTERNAL PROCEDURAL CONTROLS ACCEPTABLE: YES
S PYO AG THROAT QL IA.RAPID: NEGATIVE
SARS-COV+SARS-COV-2 AG RESP QL IA.RAPID: NOT DETECTED
TEST LOT EXPIRATION DATE: NORMAL
TEST LOT EXPIRATION DATE: NORMAL
TEST LOT NUMBER: NORMAL
TEST LOT NUMBER: NORMAL

## 2024-02-21 RX ORDER — OLANZAPINE 5 MG/1
TABLET ORAL
COMMUNITY
Start: 2024-02-12

## 2024-02-21 RX ORDER — OLANZAPINE 15 MG/1
15 TABLET ORAL NIGHTLY
COMMUNITY

## 2024-02-21 RX ORDER — GABAPENTIN 100 MG/1
100 CAPSULE ORAL
COMMUNITY
Start: 2023-12-29

## 2024-02-21 RX ORDER — AMOXICILLIN AND CLAVULANATE POTASSIUM 875; 125 MG/1; MG/1
1 TABLET, FILM COATED ORAL 2 TIMES DAILY
Qty: 20 TABLET | Refills: 0 | Status: SHIPPED | OUTPATIENT
Start: 2024-02-21 | End: 2024-02-24 | Stop reason: ALTCHOICE

## 2024-02-21 ASSESSMENT — ENCOUNTER SYMPTOMS
EYES NEGATIVE: 1
COUGH: 1
RHINORRHEA: 1
CONSTITUTIONAL NEGATIVE: 1
SORE THROAT: 1

## 2024-02-24 ENCOUNTER — V-VISIT (OUTPATIENT)
Dept: URGENT CARE | Age: 59
End: 2024-02-24

## 2024-02-24 ENCOUNTER — TELEPHONE (OUTPATIENT)
Dept: URGENT CARE | Age: 59
End: 2024-02-24

## 2024-02-24 VITALS
WEIGHT: 225 LBS | BODY MASS INDEX: 35.31 KG/M2 | HEART RATE: 128 BPM | RESPIRATION RATE: 14 BRPM | OXYGEN SATURATION: 98 % | HEIGHT: 67 IN | SYSTOLIC BLOOD PRESSURE: 112 MMHG | TEMPERATURE: 98 F | DIASTOLIC BLOOD PRESSURE: 88 MMHG

## 2024-02-24 DIAGNOSIS — B96.89 ACUTE BACTERIAL SINUSITIS: ICD-10-CM

## 2024-02-24 DIAGNOSIS — J01.90 ACUTE BACTERIAL SINUSITIS: ICD-10-CM

## 2024-02-24 DIAGNOSIS — T78.40XA ALLERGIC REACTION TO DRUG, INITIAL ENCOUNTER: Primary | ICD-10-CM

## 2024-02-24 RX ORDER — DOXYCYCLINE HYCLATE 100 MG/1
100 CAPSULE ORAL 2 TIMES DAILY
Qty: 14 CAPSULE | Refills: 0 | Status: SHIPPED | OUTPATIENT
Start: 2024-02-24 | End: 2024-03-02

## 2024-02-24 ASSESSMENT — ENCOUNTER SYMPTOMS
SORE THROAT: 0
FEVER: 0
COUGH: 1
RHINORRHEA: 0

## 2024-02-26 ENCOUNTER — TELEPHONE (OUTPATIENT)
Dept: OTHER | Age: 59
End: 2024-02-26

## 2024-02-26 ENCOUNTER — TELEPHONE (OUTPATIENT)
Dept: FAMILY MEDICINE | Age: 59
End: 2024-02-26

## 2024-03-16 NOTE — PROGRESS NOTES
Spoke to sister Luz Marina Gonzalez [employer] [facility]). She relates patient is doing wonderful but does tens to be forgetful. Patient is participating in group and looking to get on her own. Luz Marina Gonzalez asked me to keep reaching out to Semaj Mora and to keep encouraging her.  Pt has a ther
No

## 2024-07-01 ENCOUNTER — TELEPHONE (OUTPATIENT)
Dept: NEUROLOGY | Facility: CLINIC | Age: 59
End: 2024-07-01
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
## 2024-07-02 NOTE — TELEPHONE ENCOUNTER
Patient has not been seen in office since 2016.    Patient was to follow in in 3 months.    Message left for patient.

## 2024-07-02 NOTE — TELEPHONE ENCOUNTER
Patient returned call and asking for Dr. Traore to call her current doctor. Shared that provider is out of town. Patient's call transferred to make an appointment.

## 2024-07-02 NOTE — TELEPHONE ENCOUNTER
Patient is looking for medical records to be sent to current doctor. Patient is seeing her primary tomorrow and will complete a medical release form.

## 2024-07-03 ENCOUNTER — TELEPHONE (OUTPATIENT)
Dept: CARDIOLOGY | Age: 59
End: 2024-07-03

## 2024-07-05 ENCOUNTER — TELEPHONE (OUTPATIENT)
Dept: NEUROLOGY | Facility: CLINIC | Age: 59
End: 2024-07-05

## 2024-07-05 NOTE — TELEPHONE ENCOUNTER
[facility] and [facility] are requesting medical records, request was sent to [facility]

## (undated) DEVICE — ENDOSCOPY PACK - LOWER: Brand: MEDLINE INDUSTRIES, INC.

## (undated) DEVICE — Device: Brand: DEFENDO AIR/WATER/SUCTION AND BIOPSY VALVE

## (undated) DEVICE — 3M™ RED DOT™ MONITORING ELECTRODE WITH FOAM TAPE AND STICKY GEL, 50/BAG, 20/CASE, 72/PLT 2570: Brand: RED DOT™

## (undated) DEVICE — 1200CC GUARDIAN II: Brand: GUARDIAN

## (undated) DEVICE — FILTERLINE NASAL ADULT O2/CO2

## (undated) NOTE — LETTER
September 17, 2018    Mei Hernández  [address] [facility] [address] [address] [facility]      Dear Meka Zacarias: The following are the results of your recent tests. Please review the list of test results.   Your result is the value on the left; we have Gynecologic Cytology                              Case: [id number]                                  Authorizing Provider:  Olga Gautam NP  Collected:           09/13/2018 04:17 PM          Ordering Location:     [address] [place] [address]

## (undated) NOTE — MR AVS SNAPSHOT
[address]  [address] [id number] [phone]               Thank you for choosing us for your health care visit with Allison Power DO.   We are glad to serve you and happy to provide you with this Take 25 mg by mouth every 6 (six) hours as needed for Itching. ClonazePAM 0.5 MG Tabs   Take 1 tablet (0.5 mg total) by mouth 2 (two) times daily as needed for Anxiety.    Commonly known as:  KLONOPIN           * FLUoxetine HCl 40 MG Caps   Take 4 Tips for making healthy food choices  -   Enjoy your food, but eat less. Fully enjoy your food when eating. Don’t eat while distracted and slow down. Avoid over sized portions. Don’t eat while when you’re bored.      EAT THESE FOODS MORE OFTEN: EAT T

## (undated) NOTE — LETTER
08/28/19        Domingo Olszewski  [address] [facility] [address] [address] [facility]      Dear Susan Jurado,    [facility] records indicate that you have outstanding lab work and or testing that was ordered for you and has not yet been completed:  Orders Placed Th

## (undated) NOTE — MR AVS SNAPSHOT
[address]  [address] [address]  [phone]               Thank you for choosing us for your health care visit with Nannette Gibson DO.   We are glad to serve you and happy to provide you with this Ibuprofen TABS    Opioid Analgesics     Risperidone Rash    Sulfa Drugs Cross Reactors                 Today's Vital Signs     BP Pulse Temp Height Weight BMI    70/48 mmHg 108 97.9 °F (36.6 °C) (Oral) 66.5\" 187 lb 6.4 oz 29.80 kg/m2         Current Medi [email or web address]. If you've recently had a stay at the Hospital you can access your discharge instructions in [employer] by going to Visits < Admission Summaries.  If you've been to the Emergency Department or your doctor's office, you can view yo

## (undated) NOTE — MR AVS SNAPSHOT
[address]  [address] [facility], [address] [address] [phone]               Thank you for choosing us for your health care visit with Bennie Hager DO.   We are glad to serve you and happy to provide you with this Take 40 mg by mouth 2 (two) times daily. Commonly known as:  PEPCID           Fish Oil Oil   2,000 mg by Does not apply route. * FLUoxetine HCl 40 MG Caps   Take 40 mg by mouth daily.    Commonly known as:  PROZAC           * FLUoxetine HCl 20 M

## (undated) NOTE — LETTER
10/06/18        Kevyn Diez  [address] [facility] [address] [address] [facility]      Dear [place] ,    [address] [facility] records indicate that you have outstanding lab work and or testing that was ordered for you and has not yet been completed:  Orders Placed Th

## (undated) NOTE — Clinical Note
2/27/2017     Anders Paige  [address] [facility]        Dear Anders Paige: It was a pleasure speaking with you over the phone recently.  To follow up, I wanted to send you my contact information to utilize when you have